# Patient Record
Sex: FEMALE | Race: WHITE | NOT HISPANIC OR LATINO | Employment: FULL TIME | URBAN - METROPOLITAN AREA
[De-identification: names, ages, dates, MRNs, and addresses within clinical notes are randomized per-mention and may not be internally consistent; named-entity substitution may affect disease eponyms.]

---

## 2019-09-16 ENCOUNTER — OFFICE VISIT (OUTPATIENT)
Dept: FAMILY MEDICINE | Facility: CLINIC | Age: 29
End: 2019-09-16
Payer: COMMERCIAL

## 2019-09-16 VITALS
WEIGHT: 128 LBS | RESPIRATION RATE: 16 BRPM | HEIGHT: 59 IN | SYSTOLIC BLOOD PRESSURE: 120 MMHG | HEART RATE: 82 BPM | DIASTOLIC BLOOD PRESSURE: 84 MMHG | OXYGEN SATURATION: 96 % | TEMPERATURE: 98.6 F | BODY MASS INDEX: 25.8 KG/M2

## 2019-09-16 DIAGNOSIS — R79.89 ABNORMAL TSH: ICD-10-CM

## 2019-09-16 DIAGNOSIS — E03.9 HYPOTHYROIDISM, UNSPECIFIED TYPE: ICD-10-CM

## 2019-09-16 DIAGNOSIS — M54.16 RIGHT LUMBAR RADICULOPATHY: Primary | ICD-10-CM

## 2019-09-16 LAB
T3 SERPL-MCNC: 90 NG/DL (ref 60–181)
T3FREE SERPL-MCNC: 2 PG/ML (ref 2.3–4.2)
T4 FREE SERPL-MCNC: 0.79 NG/DL (ref 0.76–1.46)
T4 SERPL-MCNC: 7.5 UG/DL (ref 4.5–13.9)
TSH SERPL DL<=0.005 MIU/L-ACNC: 2.72 MU/L (ref 0.4–4)

## 2019-09-16 PROCEDURE — 84481 FREE ASSAY (FT-3): CPT | Performed by: INTERNAL MEDICINE

## 2019-09-16 PROCEDURE — 84482 T3 REVERSE: CPT | Mod: 90 | Performed by: INTERNAL MEDICINE

## 2019-09-16 PROCEDURE — 84480 ASSAY TRIIODOTHYRONINE (T3): CPT | Performed by: INTERNAL MEDICINE

## 2019-09-16 PROCEDURE — 99203 OFFICE O/P NEW LOW 30 MIN: CPT | Performed by: INTERNAL MEDICINE

## 2019-09-16 PROCEDURE — 84443 ASSAY THYROID STIM HORMONE: CPT | Performed by: INTERNAL MEDICINE

## 2019-09-16 PROCEDURE — 84436 ASSAY OF TOTAL THYROXINE: CPT | Performed by: INTERNAL MEDICINE

## 2019-09-16 PROCEDURE — 99000 SPECIMEN HANDLING OFFICE-LAB: CPT | Performed by: INTERNAL MEDICINE

## 2019-09-16 PROCEDURE — 84439 ASSAY OF FREE THYROXINE: CPT | Performed by: INTERNAL MEDICINE

## 2019-09-16 PROCEDURE — 36415 COLL VENOUS BLD VENIPUNCTURE: CPT | Performed by: INTERNAL MEDICINE

## 2019-09-16 RX ORDER — LITHIUM CARBONATE 150 MG/1
450 CAPSULE ORAL
COMMUNITY
Start: 2019-09-04 | End: 2020-07-30

## 2019-09-16 RX ORDER — LORAZEPAM 1 MG/1
TABLET ORAL
COMMUNITY
Start: 2019-06-27 | End: 2024-03-07

## 2019-09-16 RX ORDER — CETIRIZINE HYDROCHLORIDE 10 MG/1
TABLET ORAL
Refills: 1 | COMMUNITY
Start: 2019-07-23 | End: 2020-12-09

## 2019-09-16 RX ORDER — OMEPRAZOLE 40 MG/1
CAPSULE, DELAYED RELEASE ORAL
Refills: 11 | COMMUNITY
Start: 2019-08-18 | End: 2024-01-24

## 2019-09-16 RX ORDER — LAMOTRIGINE 100 MG/1
TABLET ORAL
Refills: 1 | COMMUNITY
Start: 2019-07-22 | End: 2024-03-07

## 2019-09-16 RX ORDER — HYDROXYZINE HYDROCHLORIDE 25 MG/1
25-50 TABLET, FILM COATED ORAL
COMMUNITY
Start: 2019-08-01 | End: 2024-03-07

## 2019-09-16 RX ORDER — LAMOTRIGINE 25 MG/1
TABLET ORAL
Refills: 0 | COMMUNITY
Start: 2019-08-09 | End: 2024-03-07

## 2019-09-16 RX ORDER — LEVOTHYROXINE SODIUM 25 UG/1
25 TABLET ORAL DAILY
Qty: 42 TABLET | Refills: 1 | Status: SHIPPED | OUTPATIENT
Start: 2019-09-16 | End: 2019-09-16

## 2019-09-16 RX ORDER — LITHIUM CARBONATE 300 MG/1
CAPSULE ORAL
Refills: 0 | COMMUNITY
Start: 2019-08-09 | End: 2020-07-30

## 2019-09-16 ASSESSMENT — PATIENT HEALTH QUESTIONNAIRE - PHQ9: SUM OF ALL RESPONSES TO PHQ QUESTIONS 1-9: 17

## 2019-09-16 ASSESSMENT — MIFFLIN-ST. JEOR: SCORE: 1211.23

## 2019-09-16 ASSESSMENT — PAIN SCALES - GENERAL: PAINLEVEL: MODERATE PAIN (5)

## 2019-09-16 NOTE — PROGRESS NOTES
Subjective     Alexandra Harvey is a 29 year old female who presents to clinic today for the following health issues:    HPI   Hypothyroidism Follow-up      Since last visit, patient describes the following symptoms: weight gain of 20 lbs and fatigue, mood swings, dry skin, constipation, brain fog, brittle nails, fatigue and menstrual issues       Back Pain       Duration: Since 2016         Specific cause: none    Description:   Location of pain: low back right and hip right  Character of pain: dull ache  Pain radiation:radiates into the right foot and mid back, tossing and turning at night.   New numbness or weakness in legs, not attributed to pain:  no     Intensity: Currently 5/10    History:   Pain interferes with job: No, but back hurts during activities such as [place].  History of back problems: yes in upper back and neck   Any previous MRI or X-rays: None  Sees a specialist for back pain:  No  Therapies tried without relief: massage    Alleviating factors:   Improved by: laying down with a pillow in between legs       Precipitating factors:using step stool   Worsened by: Lifting, Bending, Standing, Sitting, Lying Flat and Walking    Accompanying Signs & Symptoms:  Risk of Fracture:  None  Risk of Cauda Equina:  None  Risk of Infection:  None  Risk of Cancer:  None  Risk of Ankylosing Spondylitis:  Onset at age <35, male, AND morning back stiffness. no         Patient Active Problem List   Diagnosis     Anxiety associated with depression     Psoriasis     CARDIOVASCULAR SCREENING; LDL GOAL LESS THAN 160     Abnormal TSH     Past Surgical History:   Procedure Laterality Date     LAPAROSCOPIC APPENDECTOMY N/A 11/28/2016    Procedure: LAPAROSCOPIC APPENDECTOMY;  Surgeon: Cruz Olivia MD;  Location:  OR       Social History     Tobacco Use     Smoking status: Current Every Day Smoker     Smokeless tobacco: Never Used   Substance Use Topics     Alcohol use: Yes     Comment: occasional     Family  "History   Problem Relation Age of Onset     Hypertension Mother      Diabetes Maternal Grandmother      Hypertension Maternal Grandfather      Alcohol/Drug Paternal Grandmother          Allergies   Allergen Reactions     Estradiol      \"welts\" on legs     Recent Labs   Lab Test 09/16/19  0806 11/28/16  1814 12/30/12  0105   ALT  --  17 30   CR  --  0.58 0.62   GFRESTIMATED  --  >90  Non  GFR Calc   >90   GFRESTBLACK  --  >90   GFR Calc   >90   POTASSIUM  --  3.5 3.6   TSH 2.72  --   --       BP Readings from Last 3 Encounters:   09/20/19 133/88   09/16/19 120/84   11/28/16 113/81    Wt Readings from Last 3 Encounters:   09/20/19 57.6 kg (127 lb)   09/16/19 58.1 kg (128 lb)   11/28/16 45.4 kg (100 lb)                      Reviewed and updated as needed this visit by Provider         Review of Systems   ROS COMP: CONSTITUTIONAL:POSITIVE  for fatigue and weight gain  INTEGUMENTARY/SKIN: NEGATIVE for worrisome rashes, moles or lesions  EYES: NEGATIVE for vision changes or irritation  ENT/MOUTH: NEGATIVE for ear, mouth and throat problems  RESP: NEGATIVE for significant cough or SOB  BREAST: NEGATIVE for masses, tenderness or discharge  CV: NEGATIVE for chest pain, palpitations or peripheral edema  GI: NEGATIVE for nausea, abdominal pain, heartburn, or change in bowel habits  : NEGATIVE for frequency, dysuria, or hematuria  MUSCULOSKELETAL:As above.  NEURO: NEGATIVE for HX headaches-migraine, HX CVA, HX TIA, HX seizure D/O, involuntary movements and gait disturbance  ENDOCRINE: NEGATIVE for temperature intolerance, skin/hair changes  HEME: NEGATIVE for bleeding problems  PSYCHIATRIC: NEGATIVE for changes in mood or affect      Objective    /84 (BP Location: Left arm, Patient Position: Chair, Cuff Size: Adult Regular)   Pulse 82   Temp 98.6  F (37  C) (Oral)   Resp 16   Ht 1.499 m (4' 11\")   Wt 58.1 kg (128 lb)   LMP 09/07/2019 (Exact Date)   SpO2 96%   BMI 25.85 kg/m   "   Physical Exam   GENERAL: healthy, alert and no distress  EYES: Eyes grossly normal to inspection and conjunctivae and sclerae normal  HENT: normal cephalic/atraumatic and oral mucous membranes moist  RESP: lungs clear to auscultation - no rales, rhonchi or wheezes  CV: regular rate and rhythm, normal S1 S2, no S3 or S4, no murmur, click or rub, no peripheral edema and peripheral pulses strong  MS: no gross musculoskeletal defects noted, no edema  SKIN: no suspicious lesions or rashes  NEURO: Normal strength and tone, mentation intact and speech normal  Comprehensive back pain exam:  Tenderness of right lower lumbar area., Range of motion not limited by pain, Lower extremity strength functional and equal on both sides, Lower extremity reflexes within normal limits bilaterally and Straight leg positive on  right, indicating possible ipsilateral radiculopathy  PSYCH: fatigued and judgement and insight intact    Diagnostic Test Results:  Results for orders placed or performed in visit on 09/16/19   TSH   Result Value Ref Range    TSH 2.72 0.40 - 4.00 mU/L   T4, free   Result Value Ref Range    T4 Free 0.79 0.76 - 1.46 ng/dL   T3, total   Result Value Ref Range    Triiodothyronine (T3) 90 60 - 181 ng/dL   T3, Free   Result Value Ref Range    Free T3 2.0 (L) 2.3 - 4.2 pg/mL   T3 reverse   Result Value Ref Range    T3, Reverse ng/dL 8.8 (L) 9.0 - 27.0 ng/dL   Thyroxine total   Result Value Ref Range    T4 Total 7.5 4.5 - 13.9 ug/dL           Assessment & Plan     1. Right lumbar radiculopathy  Chronic condition that has worsened with any type of activity. Leads to insomnia since patient is uncomfortable when laying supine. Consider EMG if MRI of lumbar spine.  - XR Lumbar Spine 2/3 Views  - MR Lumbar Spine w/o Contrast; Future    2. Abnormal TSH  Previous TSH last 7/1/2019 is abnormal at 4.65, associated with normal free T4 at 0.9. Thyroid peroxidase antibody is negative. Also obtain second-line and third-line tests.  -  "T4, free  - T3, total  - T3, Free  - T3 reverse  - Thyroxine total    3. Hypothyroidism, unspecified type  Based on today's test, with low free T3, despite normal TSH and free T4, recommend treatment with Levothyroxine since patient is symptomatic.  - TSH  - TSH; Future  - T4, free; Future  - T3, Free; Future  - Thyroxine total; Future  - T3, total; Future  - levothyroxine (SYNTHROID/LEVOTHROID) 25 MCG tablet; Take 1 tablet (25 mcg) by mouth daily     Tobacco Cessation:   reports that she has been smoking. She has never used smokeless tobacco.  Tobacco Cessation Action Plan: Information offered: Patient not interested at this time      BMI:   Estimated body mass index is 25.85 kg/m  as calculated from the following:    Height as of this encounter: 1.499 m (4' 11\").    Weight as of this encounter: 58.1 kg (128 lb).   Weight management plan: diet and exercise.        FUTURE APPOINTMENTS:       - Follow-up visit in one week for discussion of MRI of lumbar spine.      Magdiel Weiner MD  [facility]          "

## 2019-09-16 NOTE — TELEPHONE ENCOUNTER
"Requested Prescriptions   Pending Prescriptions Disp Refills     levothyroxine (SYNTHROID/LEVOTHROID) 25 MCG tablet [Pharmacy Med Name: LEVOTHYROXINE 0.025MG (25MCG) TAB]  Last Written Prescription Date:  9/16/19  Last Fill Quantity: 42,  # refills: 1   Last Office Visit with [facility] or [facility] prescribing provider:  9/16/19   Future Office Visit:      90 tablet 1     Sig: TAKE 1 TABLET(25 MCG) BY MOUTH DAILY       Thyroid Protocol Failed - 9/16/2019  3:34 PM        Failed - Medication is active on med list        Passed - Patient is 12 years or older        Passed - Recent (12 mo) or future (30 days) visit within the authorizing provider's specialty     Patient had office visit in the last 12 months or has a visit in the next 30 days with authorizing provider or within the authorizing provider's specialty.  See \"Patient Info\" tab in inbasket, or \"Choose Columns\" in Meds & Orders section of the refill encounter.              Passed - Normal TSH on file in past 12 months     Recent Labs   Lab Test 09/16/19  0806   TSH 2.72              Passed - No active pregnancy on record     If patient is pregnant or has had a positive pregnancy test, please check TSH.          Passed - No positive pregnancy test in past 12 months     If patient is pregnant or has had a positive pregnancy test, please check TSH.            "

## 2019-09-16 NOTE — PATIENT INSTRUCTIONS
At [facility], we strive to deliver an exceptional experience to you, every time we see you.  If you receive a survey in the mail, please send us back your thoughts. We really do value your feedback.    Based on your medical history, these are the current health maintenance/preventive care services that you are due for (some may have been done at this visit.)  Health Maintenance Due   Topic Date Due     PREVENTIVE CARE VISIT  1990     DEPRESSION ACTION PLAN  1990     PAP  1990     HIV SCREENING  02/10/2005     DTAP/TDAP/TD IMMUNIZATION (1 - Tdap) 02/10/2015     PHQ-9  10/21/2015     INFLUENZA VACCINE (1) 09/01/2019         Suggested websites for health information:  Www.[employer].org : Up to date and easily searchable information on multiple topics.  Www.medlineplus.gov : medication info, interactive tutorials, watch real surgeries online  Www.familydoctor.org : good info from the Academy of Family Physicians  Www.cdc.gov : public health info, travel advisories, epidemics (H1N1)  Www.aap.org : children's health info, normal development, vaccinations  Www.health.state[place].us : [place] dept of health, public health issues in [place], N1N1    Your care team:                            Family Medicine Internal Medicine   MD Magdiel Madrid MD Shantel Branch-Fleming, MD Katya Georgiev PA-C Nam Ho, MD Pediatrics   WOLF Escobar, MD Radha Ramos CNP, MD Deborah Mielke, MD Kim Thein, APRRACQUEL [facility]      Clinic hours: Monday - Thursday 7 am-7 pm; Fridays 7 am-5 pm.   Urgent care: Monday - Friday 11 am-9 pm; Saturday and Sunday 9 am-5 pm.  Pharmacy : Monday -Thursday 8 am-8 pm; Friday 8 am-6 pm; Saturday and Sunday 9 am-5 pm.     Clinic: [phone]   Pharmacy: [phone]    Patient Education     Having EMG and NCS Tests  You will be having electromyography (EMG) and nerve conduction studies (NCS) to  measure your muscle and nerve function. In most cases, both tests are done. NCS is most often done first. You will be asked to lie on an exam table with a blanket over you. You may have 1 or both of the following:    Nerve conduction study (NCS)  During NCS, mild electrical currents are used to test how fast electrical signals move along your nerves. The healthcare provider will put small metal disks (electrodes) on your skin on the area of your body being tested. This will be done using water-based gel or paste. A doctor or technologist will apply mild electrical currents to your skin. Your muscles will twitch, but the test won t harm you. Currents are usually applied to the same area several times. Usually the intensity of the electrical stimulation is increased on each body part. There may be some increasing mild pain that varies from person to person. But the electrical shock is not dangerous. The test may continue on other parts of your body unless the reason for doing the test is limited to a small part of the body.  Electromyography (EMG)  Most of the electrodes will be removed for EMG. The healthcare provider will clean the area being tested with alcohol. A very thin sterile needle will be put into the muscles in this area. When the needle is inserted, you may feel as if your skin is being pinched. Try to relax and do as instructed. You will be asked to relax and contract the muscle being tested. Following instructions will allow your provider to interpret the test results.  Let the technologist know  For your safety and for the success of your test, tell the technologist if you:    Have any bleeding problems    Take blood thinners (anticoagulants) or other medicines, including aspirin    Have any immune system problems    Have had neck or back surgery  You may also be asked questions about your overall health.  Before the test  Prepare for your test as instructed. Shower or bathe, but don't use powder, oil,  or lotion. Your skin should be clean and free of excess oil. Wear loose clothes. But know that you may be asked to change into a hospital gown. The entire test will take about 60 minutes. Allow extra time to check in.  After your test  Before you leave, all electrodes will be removed. You can then get right back to your normal routine. If you feel tired or have some mild pain, take it easy. If you were told to stop taking any medicines for your test, ask when you can start taking them again. Your healthcare provider will let you know when your test results are ready.  Date Last Reviewed: 12/1/2017 2000-2018 [employer]. [address] [place] [address], [place] [address]. All rights reserved. This information is not intended as a substitute for professional medical care. Always follow your healthcare professional's instructions.           Patient Education     Understanding Lumbar Radiculopathy    Lumbar radiculopathy is irritation or inflammation of a nerve root in the low back. It causes symptoms that spread out from the back down one or both legs. To understand this condition, it helps to understand the parts of the spine:    Vertebrae. These are bones that stack to form the spine. The lumbar spine contains the 5 bottom vertebrae.    Disks. These are soft pads of tissue between the vertebrae. They act as shock absorbers for the spine.    Spinal canal. This is a tunnel formed within the stacked vertebrae. In the lumbar spine, nerves run through this canal.    Nerves. These branch off and leave the spinal canal, traveling out to parts of the body. As they leave the spinal canal, nerves pass through openings between the vertebrae. The nerve root is the part of the nerve that is closest to the spinal canal.    Sciatic nerve. This is a large nerve formed from several nerve roots in the low back. This nerve extends down the back of the leg to the foot.  With lumbar radiculopathy, nerve roots in the low back  become irritated. This leads to pain and symptoms. The sciatic nerve is commonly involved, so the condition is often called sciatica.  What causes lumbar radiculopathy?  Aging, injury, poor posture, extra body weight, and other issues can lead to problems in the low back. These problems may then irritate nerve roots. They include:    Damage to a disk in the lumbar spine. The damaged disk may then press on nearby nerve roots.    Degeneration from wear and tear, and aging. This can lead to narrowing (stenosis) of the openings between the vertebrae. The narrowed openings press on nerve roots as they leave the spinal canal.    Unstable spine. This is when a vertebra slips forward. It can then press on a nerve root.  Other, less common things can put pressure on nerves in the low back. These include diabetes, infection, or a tumor.  Symptoms of lumbar radiculopathy  These include:    Pain in the low back    Pain, numbness, tingling, or weakness that travels into the buttocks, hip, groin, or leg    Muscle spasms  Treatment for lumbar radiculopathy  In most cases, your healthcare provider will first try treatments that help relieve symptoms. These may include:    Prescription and over-the-counter pain medicines. These help relieve pain, swelling, and irritation.    Limits on positions and activities that increase pain. But lying in bed or avoiding all movement is only recommended for a short period of time.    Physical therapy, including exercises and stretches. This helps decrease pain and increase movement and function.    Steroid shots into the lower back. This may help relieve symptoms for a time.    Weight-loss program. If you are overweight, losing extra pounds may help relieve symptoms.  In some cases, you may need surgery to fix the underlying problem. This depends on the cause, the symptoms, and how long the pain has lasted.  Possible complications  Over time, an irritated and inflamed nerve may become damaged.  This may lead to long-lasting (permanent) numbness or weakness in your legs and feet. If symptoms change suddenly or get worse, be sure to let your healthcare provider know.  When to call your healthcare provider  Call your healthcare provider right away if you have any of these:    New pain or pain that gets worse    New or increasing weakness, tingling, or numbness in your leg or foot    Problems controlling your bladder or bowel   Date Last Reviewed: 3/10/2016    2255-9836 [employer]. [address] [place] [address]. All rights reserved. This information is not intended as a substitute for professional medical care. Always follow your healthcare professional's instructions.

## 2019-09-17 ENCOUNTER — TELEPHONE (OUTPATIENT)
Dept: FAMILY MEDICINE | Facility: CLINIC | Age: 29
End: 2019-09-17

## 2019-09-17 RX ORDER — LEVOTHYROXINE SODIUM 25 UG/1
25 TABLET ORAL DAILY
Status: CANCELLED | OUTPATIENT
Start: 2019-09-17

## 2019-09-17 RX ORDER — LEVOTHYROXINE SODIUM 25 UG/1
25 TABLET ORAL DAILY
Qty: 42 TABLET | Refills: 1 | Status: SHIPPED | OUTPATIENT
Start: 2019-09-17 | End: 2020-01-22

## 2019-09-17 NOTE — TELEPHONE ENCOUNTER
Writer called and spoke with patient today regarding abnormal thyroid labs and recommendation to be taking Levothyroxine 25 mcg daily.  Per provider documentation, notes that prescription for levothyroxine was sent in to pharmacy but writer is unable to locate any recent script in chart for levothyroxine.    Routing to provider, please review and send in script for Levothyroxine 25 mcg, once daily, as noted. Do not believe this was sent.  Thank you!    Estela Oh RN

## 2019-09-18 ENCOUNTER — ANCILLARY PROCEDURE (OUTPATIENT)
Dept: MRI IMAGING | Facility: CLINIC | Age: 29
End: 2019-09-18
Attending: INTERNAL MEDICINE
Payer: COMMERCIAL

## 2019-09-18 DIAGNOSIS — M54.17 LUMBOSACRAL RADICULOPATHY AT L5: ICD-10-CM

## 2019-09-18 PROCEDURE — 72148 MRI LUMBAR SPINE W/O DYE: CPT | Mod: TC

## 2019-09-18 RX ORDER — LEVOTHYROXINE SODIUM 25 UG/1
TABLET ORAL
Qty: 90 TABLET | Refills: 1 | Status: SHIPPED | OUTPATIENT
Start: 2019-09-18

## 2019-09-18 NOTE — TELEPHONE ENCOUNTER
Routing refill request to provider for review/approval because:  Note from pharmacy is patient is wanting a 90 day supply.    Carrie Carpenter RN, [facility]

## 2019-09-19 ENCOUNTER — TELEPHONE (OUTPATIENT)
Dept: FAMILY MEDICINE | Facility: CLINIC | Age: 29
End: 2019-09-19

## 2019-09-19 LAB — T3REVERSE SERPL-MCNC: 8.8 NG/DL (ref 9–27)

## 2019-09-19 NOTE — TELEPHONE ENCOUNTER
Reason for Call:  Same Day Appointment, Requested Provider:  Regine    PCP: Magdiel Weiner    Reason for visit: fatigue / MRI follow up     Duration of symptoms: recent    Have you been treated for this in the past? Yes    Additional comments: Pt has been falling asleep at the week multiple times this morning and Pt is concerned about that as well .  She also was told to follow up with Dr. Wenier in 1-2 days after her MRI and would like to see if Dr. Weiner can fit Pt in.    Can we leave a detailed message on this number? YES    Phone number patient can be reached at: Home number on file [phone] (home)    Best Time: anytime    Call taken on 9/19/2019 at 8:28 AM by Sean Ly

## 2019-09-19 NOTE — TELEPHONE ENCOUNTER
Called, patient cannot make it today at 10a, patient is scheduled for tomorrow at 8:20a.  Kris Soto,  [facility]

## 2019-09-20 ENCOUNTER — OFFICE VISIT (OUTPATIENT)
Dept: FAMILY MEDICINE | Facility: CLINIC | Age: 29
End: 2019-09-20
Payer: COMMERCIAL

## 2019-09-20 VITALS
BODY MASS INDEX: 25.6 KG/M2 | DIASTOLIC BLOOD PRESSURE: 88 MMHG | HEIGHT: 59 IN | TEMPERATURE: 98.3 F | OXYGEN SATURATION: 98 % | HEART RATE: 83 BPM | SYSTOLIC BLOOD PRESSURE: 133 MMHG | RESPIRATION RATE: 18 BRPM | WEIGHT: 127 LBS

## 2019-09-20 DIAGNOSIS — R40.0 DAYTIME SLEEPINESS: Primary | ICD-10-CM

## 2019-09-20 DIAGNOSIS — M54.16 RIGHT LUMBAR RADICULOPATHY: ICD-10-CM

## 2019-09-20 DIAGNOSIS — M79.18 MYOFASCIAL PAIN: ICD-10-CM

## 2019-09-20 DIAGNOSIS — F31.4 BIPOLAR DISORDER, CURRENT EPISODE DEPRESSED, SEVERE, WITHOUT PSYCHOTIC FEATURES (H): ICD-10-CM

## 2019-09-20 LAB — LITHIUM SERPL-SCNC: 0.49 MMOL/L (ref 0.6–1.2)

## 2019-09-20 PROCEDURE — 36415 COLL VENOUS BLD VENIPUNCTURE: CPT | Performed by: INTERNAL MEDICINE

## 2019-09-20 PROCEDURE — 80178 ASSAY OF LITHIUM: CPT | Performed by: INTERNAL MEDICINE

## 2019-09-20 PROCEDURE — 99214 OFFICE O/P EST MOD 30 MIN: CPT | Performed by: INTERNAL MEDICINE

## 2019-09-20 RX ORDER — CYCLOBENZAPRINE HCL 10 MG
10 TABLET ORAL 3 TIMES DAILY PRN
Qty: 30 TABLET | Refills: 11 | Status: SHIPPED | OUTPATIENT
Start: 2019-09-20 | End: 2021-01-18

## 2019-09-20 ASSESSMENT — MIFFLIN-ST. JEOR: SCORE: 1206.7

## 2019-09-20 ASSESSMENT — PAIN SCALES - GENERAL: PAINLEVEL: MODERATE PAIN (5)

## 2019-09-20 NOTE — PATIENT INSTRUCTIONS
At [facility], we strive to deliver an exceptional experience to you, every time we see you.  If you receive a survey in the mail, please send us back your thoughts. We really do value your feedback.    Based on your medical history, these are the current health maintenance/preventive care services that you are due for (some may have been done at this visit.)  Health Maintenance Due   Topic Date Due     PREVENTIVE CARE VISIT  1990     DEPRESSION ACTION PLAN  1990     PAP  1990     HIV SCREENING  02/10/2005     INFLUENZA VACCINE (1) 09/01/2019         Suggested websites for health information:  Www.[employer].org : Up to date and easily searchable information on multiple topics.  Www.medlineplus.gov : medication info, interactive tutorials, watch real surgeries online  Www.familydoctor.org : good info from the Academy of Family Physicians  Www.cdc.gov : public health info, travel advisories, epidemics (H1N1)  Www.aap.org : children's health info, normal development, vaccinations  Www.health.[facility].us : [place] dept of health, public health issues in [place], N1N1    Your care team:                            Family Medicine Internal Medicine   MD Magdiel Madrid MD Shantel Branch-Fleming, MD Katya Georgiev PA-C Nam Ho, MD Pediatrics   WOLF Escobar, ELISEO Singh APRN CNP   MD Radha Conner MD Deborah Mielke, MD Kim Thein, APRN [facility]      Clinic hours: Monday - Thursday 7 am-7 pm; Fridays 7 am-5 pm.   Urgent care: Monday - Friday 11 am-9 pm; Saturday and Sunday 9 am-5 pm.  Pharmacy : Monday -Thursday 8 am-8 pm; Friday 8 am-6 pm; Saturday and Sunday 9 am-5 pm.     Clinic: [phone]   Pharmacy: [phone]

## 2019-09-20 NOTE — PROGRESS NOTES
Subjective     Alexandra Harvey is a 29 year old female who presents to clinic today for the following health issues:    HPI     Back Pain-MRI follow up      Duration: Since 2016        Specific cause: none    Description:   Location of pain: right sided low back and hip  Character of pain: dull ache and tingling sensation   Pain radiation:right foot, left side of back  New numbness or weakness in legs, not attributed to pain:  no     Intensity: Currently 5/10    History:   Pain interferes with job: No  History of back problems: yes in upper back and neck  Any previous MRI or X-rays: MRI 9/18/19 lumbar spine  Sees a specialist for back pain:  yes  Therapies tried without relief: massage    Alleviating factors:   Improved by: laying down with pillow in between legs      Precipitating factors:  Worsened by: Lifting, Bending, Standing, Sitting, Lying Flat and Walking    Accompanying Signs & Symptoms:  Risk of Fracture:  None  Risk of Cauda Equina:  None  Risk of Infection:  None  Risk of Cancer:  None  Risk of Ankylosing Spondylitis:  Onset at age <35, male, AND morning back stiffness. no       Drowsiness      Duration: two days    Description (location/character/radiation): Episodes of sleepiness during the daytime after taking Levothyroxine 25 mcg.    Intensity:  Moderate-severe. Patient claims that she has to pull over on the side of the road.    Accompanying signs and symptoms: NO episodes of motor weakness nor snoring.    History (similar episodes/previous evaluation): YES.    Precipitating or alleviating factors: Depression. Review of records during 9/16/2019 visit shows that patient has been sleeping more, and she was event sent home work due to inability to concentrate. No alcohol use (quit last May 2019). Patient takes Lamictal, Sertraline and Ativan for bipolar disorder, prescribed by her NP provider from [facility].     Therapies tried and outcome: None         Patient Active Problem List  "  Diagnosis     Anxiety associated with depression     Psoriasis     CARDIOVASCULAR SCREENING; LDL GOAL LESS THAN 160     Abnormal TSH     Lumbosacral radiculopathy at L5, right     Hypothyroidism, unspecified type     Past Surgical History:   Procedure Laterality Date     LAPAROSCOPIC APPENDECTOMY N/A 11/28/2016    Procedure: LAPAROSCOPIC APPENDECTOMY;  Surgeon: Cruz Olivia MD;  Location:  OR       Social History     Tobacco Use     Smoking status: Current Every Day Smoker     Smokeless tobacco: Never Used   Substance Use Topics     Alcohol use: Yes     Comment: occasional     Family History   Problem Relation Age of Onset     Hypertension Mother      Diabetes Maternal Grandmother      Hypertension Maternal Grandfather      Alcohol/Drug Paternal Grandmother          Allergies   Allergen Reactions     Estradiol      \"welts\" on legs     Recent Labs   Lab Test 09/16/19  0806 11/28/16  1814 12/30/12  0105   ALT  --  17 30   CR  --  0.58 0.62   GFRESTIMATED  --  >90  Non  GFR Calc   >90   GFRESTBLACK  --  >90   GFR Calc   >90   POTASSIUM  --  3.5 3.6   TSH 2.72  --   --       BP Readings from Last 3 Encounters:   09/20/19 133/88   09/16/19 120/84   11/28/16 113/81    Wt Readings from Last 3 Encounters:   09/20/19 57.6 kg (127 lb)   09/16/19 58.1 kg (128 lb)   11/28/16 45.4 kg (100 lb)                    Reviewed and updated as needed this visit by Provider         Review of Systems   ROS COMP: CONSTITUTIONAL:POSITIVE  for weight gain.  INTEGUMENTARY/SKIN: NEGATIVE for worrisome rashes, moles or lesions  EYES: NEGATIVE for vision changes or irritation  ENT/MOUTH: NEGATIVE for ear, mouth and throat problems  RESP: NEGATIVE for significant cough or SOB  CV: NEGATIVE for chest pain, palpitations or peripheral edema  GI: NEGATIVE for nausea, abdominal pain, heartburn, or change in bowel habits  : NEGATIVE for frequency, dysuria, or hematuria  MUSCULOSKELETAL: NEGATIVE for " "significant arthralgias but POSITIVE for muscle pain of both trapezius muscles.   NEURO: POSITIVE for memory changes, seen by a neurologist  Last 7/8/2019 at [facility] in [place].  NEGATIVE for HX head injury  , HX CVA, HX TIA and HX seizure D/O  ENDOCRINE: NEGATIVE for temperature intolerance, skin/hair changes  HEME: NEGATIVE for bleeding problems  PSYCHIATRIC: POSITIVE for      Objective    /88 (BP Location: Left arm, Patient Position: Chair, Cuff Size: Adult Regular)   Pulse 83   Temp 98.3  F (36.8  C) (Oral)   Resp 18   Ht 1.499 m (4' 11\")   Wt 57.6 kg (127 lb)   LMP 09/07/2019 (Exact Date)   SpO2 98%   BMI 25.65 kg/m    Body mass index is 25.65 kg/m .  Physical Exam   GENERAL: alert, no distress, over weight and fatigued  EYES: Eyes grossly normal to inspection  HENT: normal cephalic/atraumatic and oral mucous membranes moist  CV: regular rate and rhythm, normal S1 S2, no S3 or S4, no murmur, click or rub, no peripheral edema and peripheral pulses strong  MS: no gross musculoskeletal defects noted, no edema  SKIN: no suspicious lesions or rashes  NEURO: Normal strength and tone, sensory exam grossly normal and mentation intact  Comprehensive back pain exam:  Tenderness of right lower lumbar area. and Straight leg positive on  right, indicating possible ipsilateral radiculopathy    Diagnostic Test Results:  Results for orders placed or performed in visit on 09/20/19   Lithium level   Result Value Ref Range    Lithium Level 0.49 (L) 0.60 - 1.20 mmol/L   MR LUMBAR SPINE WITHOUT CONTRAST 9/18/2019 7:44 PM      HISTORY: Radiculopathy, greater than 6 weeks conservative treatment,  persistent symptoms. Lumbosacral radiculopathy at L5.     TECHNIQUE: Multiplanar multisequence images were obtained through the  lumbar spine without contrast.     COMPARISON: None.     FINDINGS: Five lumbar-type vertebral bodies are presumed. Posterior  alignment is normal. The conus medullaris is " normal with its tip at  the L1-L2 level. Disc spaces are preserved in height and water  content. Bone marrow signal intensity is normal.     T12-L1: Normal.     L1-L2: Normal.     L2-L3: Normal.     L3-L4: Normal.     L4-L5: Normal.     L5-S1: Normal.     Paraspinal soft tissues: Unremarkable as visualized.                                                                      IMPRESSION: Negative lumbar spine MRI examination without contrast.     JOSIAH LOU MD        Assessment & Plan     1. Daytime sleepiness  Patient attributes intake of Levothyroxine 25 mcg as cause of her daytime drowsiness. However, there is broad differential diagnoses, such as hypomania from bipolar disorder with currently depressed mood, adverse drug effect from Levothyroxine or polypharmacy for bipolar disorder. Obtain both Lithium and Sertraline levels. Review of records show patient also has difficulty concentrating and fatigue. Consider starting Strattera, for probable ADHD and suspected sleep disorder.  - Lithium level  - Sertraline; Future  - Sertraline    2. Right lumbar radiculopathy  MRI of lumbar spine explained to Ms. Harvey. Suspect piriformis syndrome. Nonetheless, recommend EMG for definitive diagnosis.  - NEUROLOGY ADULT REFERRAL; Future  - EMG; Future    3. Bipolar disorder, current episode depressed, severe, without psychotic features (H)  No recent drug levels. Patient's drowsiness may still be attributed to her pharmacy.  - Lithium level  - Sertraline; Future    4. Myofascial pain    - cyclobenzaprine (FLEXERIL) 10 MG tablet; Take 1 tablet (10 mg) by mouth 3 times daily as needed for muscle spasms  Dispense: 30 tablet; Refill: 11     Tobacco Cessation:   reports that she has been smoking. She has been smoking about 0.00 packs per day. She has never used smokeless tobacco.  Tobacco Cessation Action Plan: Information offered: Patient not interested at this time      BMI:   Estimated body mass index is 25.65 kg/m  as  "calculated from the following:    Height as of this encounter: 1.499 m (4' 11\").    Weight as of this encounter: 57.6 kg (127 lb).   Weight management plan: dietary changes.        FUTURE APPOINTMENTS:       - Follow-up visit in 2 - 4 weeks.      Magdiel Weiner MD  [facility]    "

## 2019-09-23 PROBLEM — E03.9 HYPOTHYROIDISM, UNSPECIFIED TYPE: Status: ACTIVE | Noted: 2019-09-23

## 2019-09-23 PROBLEM — R79.89 ABNORMAL TSH: Status: ACTIVE | Noted: 2019-09-23

## 2019-09-23 PROBLEM — M54.17 LUMBOSACRAL RADICULOPATHY AT L5: Status: ACTIVE | Noted: 2019-09-23

## 2019-09-23 PROCEDURE — 80332 ANTIDEPRESSANTS CLASS 1 OR 2: CPT | Mod: 90 | Performed by: INTERNAL MEDICINE

## 2019-09-23 PROCEDURE — 99000 SPECIMEN HANDLING OFFICE-LAB: CPT | Performed by: INTERNAL MEDICINE

## 2019-09-23 PROCEDURE — 36415 COLL VENOUS BLD VENIPUNCTURE: CPT | Performed by: INTERNAL MEDICINE

## 2019-09-29 PROBLEM — F43.10 PTSD (POST-TRAUMATIC STRESS DISORDER): Status: ACTIVE | Noted: 2019-09-29

## 2019-09-29 PROBLEM — F41.1 GAD (GENERALIZED ANXIETY DISORDER): Status: ACTIVE | Noted: 2019-09-29

## 2019-09-29 PROBLEM — M54.16 RIGHT LUMBAR RADICULOPATHY: Status: ACTIVE | Noted: 2019-09-29

## 2019-09-29 PROBLEM — F32.2 SEVERE MAJOR DEPRESSION WITHOUT PSYCHOTIC FEATURES (H): Status: ACTIVE | Noted: 2019-09-29

## 2019-09-29 PROBLEM — F33.0 MAJOR DEPRESSIVE DISORDER, RECURRENT, MILD (H): Status: ACTIVE | Noted: 2019-01-18

## 2019-09-29 PROBLEM — F31.9 BIPOLAR DISORDER (H): Status: ACTIVE | Noted: 2019-09-29

## 2019-09-29 PROBLEM — J30.2 SEASONAL ALLERGIES: Status: ACTIVE | Noted: 2019-09-29

## 2019-09-29 PROBLEM — R40.0 DAYTIME SLEEPINESS: Status: ACTIVE | Noted: 2019-09-29

## 2019-09-29 PROBLEM — N80.9 ENDOMETRIOSIS: Status: ACTIVE | Noted: 2018-07-26

## 2019-09-29 PROBLEM — M79.18 MYOFASCIAL PAIN: Status: ACTIVE | Noted: 2019-09-29

## 2019-09-29 PROBLEM — E03.8 SUBCLINICAL HYPOTHYROIDISM: Status: ACTIVE | Noted: 2019-09-29

## 2019-09-30 ENCOUNTER — TRANSFERRED RECORDS (OUTPATIENT)
Dept: HEALTH INFORMATION MANAGEMENT | Facility: CLINIC | Age: 29
End: 2019-09-30

## 2019-10-02 ENCOUNTER — TELEPHONE (OUTPATIENT)
Dept: FAMILY MEDICINE | Facility: CLINIC | Age: 29
End: 2019-10-02

## 2019-10-02 LAB
NORSERTRALINE SERPL-MCNC: 145 NG/ML
SERTRALINE SERPL-MCNC: 68 NG/ML (ref 30–200)

## 2019-10-02 NOTE — TELEPHONE ENCOUNTER
Reason for Call:  Other call back    Detailed comments: Pt states that she received a missed call from  but I couldn't find the encounter I apologize. She would like to request another call. Thank you.    Phone Number Patient can be reached at: Home number on file [phone] (home)    Best Time: Any    Can we leave a detailed message on this number? YES    Call taken on 10/2/2019 at 4:42 PM by Magdalena Almanzar

## 2019-10-04 NOTE — TELEPHONE ENCOUNTER
Called patient and she states thinks she knows who may have called and what it was regarding. No further action needed.    Dave Thorpe CMA

## 2019-10-30 DIAGNOSIS — E03.9 HYPOTHYROIDISM, UNSPECIFIED TYPE: ICD-10-CM

## 2019-10-30 LAB
T3 SERPL-MCNC: 104 NG/DL (ref 60–181)
T3FREE SERPL-MCNC: 2.4 PG/ML (ref 2.3–4.2)
T4 FREE SERPL-MCNC: 0.83 NG/DL (ref 0.76–1.46)
T4 SERPL-MCNC: 8.6 UG/DL (ref 4.5–13.9)
TSH SERPL DL<=0.005 MIU/L-ACNC: 7.04 MU/L (ref 0.4–4)

## 2019-10-30 PROCEDURE — 84481 FREE ASSAY (FT-3): CPT | Performed by: INTERNAL MEDICINE

## 2019-10-30 PROCEDURE — 84439 ASSAY OF FREE THYROXINE: CPT | Performed by: INTERNAL MEDICINE

## 2019-10-30 PROCEDURE — 84436 ASSAY OF TOTAL THYROXINE: CPT | Performed by: INTERNAL MEDICINE

## 2019-10-30 PROCEDURE — 84443 ASSAY THYROID STIM HORMONE: CPT | Performed by: INTERNAL MEDICINE

## 2019-10-30 PROCEDURE — 36415 COLL VENOUS BLD VENIPUNCTURE: CPT | Performed by: INTERNAL MEDICINE

## 2019-10-30 PROCEDURE — 84480 ASSAY TRIIODOTHYRONINE (T3): CPT | Performed by: INTERNAL MEDICINE

## 2020-01-06 ENCOUNTER — MYC MEDICAL ADVICE (OUTPATIENT)
Dept: FAMILY MEDICINE | Facility: CLINIC | Age: 30
End: 2020-01-06

## 2020-01-06 DIAGNOSIS — E03.9 HYPOTHYROIDISM, UNSPECIFIED TYPE: Primary | ICD-10-CM

## 2020-01-06 NOTE — TELEPHONE ENCOUNTER
Patient requesting lab orders before scheduling follow up visit with Dr. Weiner. Orders pended; please sign if appropriate.       Tyrell Pitts RN, BSN, PHN

## 2020-01-12 DIAGNOSIS — E03.9 HYPOTHYROIDISM, UNSPECIFIED TYPE: ICD-10-CM

## 2020-01-12 LAB
T3FREE SERPL-MCNC: 2.4 PG/ML (ref 2.3–4.2)
T4 SERPL-MCNC: 8.7 UG/DL (ref 4.5–13.9)

## 2020-01-12 PROCEDURE — 84436 ASSAY OF TOTAL THYROXINE: CPT | Performed by: INTERNAL MEDICINE

## 2020-01-12 PROCEDURE — 36415 COLL VENOUS BLD VENIPUNCTURE: CPT | Performed by: INTERNAL MEDICINE

## 2020-01-12 PROCEDURE — 84443 ASSAY THYROID STIM HORMONE: CPT | Performed by: INTERNAL MEDICINE

## 2020-01-12 PROCEDURE — 84439 ASSAY OF FREE THYROXINE: CPT | Performed by: INTERNAL MEDICINE

## 2020-01-12 PROCEDURE — 84481 FREE ASSAY (FT-3): CPT | Performed by: INTERNAL MEDICINE

## 2020-01-13 LAB
T4 FREE SERPL-MCNC: 0.93 NG/DL (ref 0.76–1.46)
TSH SERPL DL<=0.005 MIU/L-ACNC: 0.61 MU/L (ref 0.4–4)

## 2020-01-20 ENCOUNTER — OFFICE VISIT (OUTPATIENT)
Dept: FAMILY MEDICINE | Facility: CLINIC | Age: 30
End: 2020-01-20
Payer: COMMERCIAL

## 2020-01-20 VITALS
HEART RATE: 78 BPM | HEIGHT: 59 IN | RESPIRATION RATE: 15 BRPM | DIASTOLIC BLOOD PRESSURE: 86 MMHG | WEIGHT: 141.8 LBS | SYSTOLIC BLOOD PRESSURE: 124 MMHG | BODY MASS INDEX: 28.59 KG/M2 | OXYGEN SATURATION: 100 % | TEMPERATURE: 98.6 F

## 2020-01-20 DIAGNOSIS — S43.81XA: Primary | ICD-10-CM

## 2020-01-20 PROCEDURE — 99213 OFFICE O/P EST LOW 20 MIN: CPT | Performed by: FAMILY MEDICINE

## 2020-01-20 ASSESSMENT — MIFFLIN-ST. JEOR: SCORE: 1273.83

## 2020-01-20 NOTE — NURSING NOTE
"Chief Complaint   Patient presents with     Back Pain     /86   Pulse 78   Temp 98.6  F (37  C) (Oral)   Resp 15   Ht 1.499 m (4' 11\")   Wt 64.3 kg (141 lb 12.8 oz)   SpO2 100%   BMI 28.64 kg/m   Estimated body mass index is 28.64 kg/m  as calculated from the following:    Height as of this encounter: 1.499 m (4' 11\").    Weight as of this encounter: 64.3 kg (141 lb 12.8 oz).  Medication Reconciliation: complete        Health Maintenance Due Pending Provider Review:  Pap Smear    Patient to schedule pap smear.    Maria Teresa Bryson MA  [facility]  [phone]  "

## 2020-01-20 NOTE — PATIENT INSTRUCTIONS
1. Right scapulocostal sprain, initial encounter  - [facility] CHIROPRACTIC REFERRAL; Future  Warm or cold packs as needed     Over the counter ibuprofen 600 mg up to three times daily as needed  For next 3-5 days

## 2020-01-20 NOTE — PROGRESS NOTES
"Subjective     Alexandra Harvey is a 29 year old female who presents to clinic today for the following health issues:    HPI   Musculoskeletal problem/pain      Duration: 2 days    Description  Location: upper back; right shoulder    Intensity:  moderate    Accompanying signs and symptoms: none    History  Previous similar problem: no   Previous evaluation:  none    Precipitating or alleviating factors:  Trauma or overuse: no   Aggravating factors include: most movements    Therapies tried and outcome: NSAID - Ibuprofen  Cleaned bathroom the day before and scrubbed the floor hard  Pain on the right scapular region.    PROBLEMS TO ADD ON...    BP Readings from Last 3 Encounters:   01/20/20 124/86   09/20/19 133/88   09/16/19 120/84    Wt Readings from Last 3 Encounters:   01/20/20 64.3 kg (141 lb 12.8 oz)   09/20/19 57.6 kg (127 lb)   09/16/19 58.1 kg (128 lb)                    PROBLEMS TO ADD ON...  Reviewed and updated as needed this visit by Provider         Review of Systems   ROS COMP: Constitutional, HEENT, cardiovascular, pulmonary, GI, , musculoskeletal, neuro, skin, endocrine and psych systems are negative, except as otherwise noted.      Objective    /86   Resp 15   Ht 1.499 m (4' 11\")   Wt 64.3 kg (141 lb 12.8 oz)   BMI 28.64 kg/m    Body mass index is 28.64 kg/m .  Physical Exam   GENERAL: healthy, alert and no distress  Righ scapular region- starin on papation  Good ROM on shulder and back otherwise   NECK: no adenopathy, no asymmetry, masses, or scars and thyroid normal to palpation  RESP: lungs clear to auscultation - no rales, rhonchi or wheezes  CV: regular rate and rhythm, normal S1 S2, no S3 or S4, no murmur, click or rub, no peripheral edema and peripheral pulses strong  ABDOMEN: soft, nontender, no hepatosplenomegaly, no masses and bowel sounds normal    Diagnostic Test Results:  Labs reviewed in Epic        Assessment & Plan     1. Right scapulocostal sprain, initial " encounter  - [facility] CHIR[facility]RACTIC REFERRAL; Future  Warm or cold packs as needed     Over the counter ibuprofen 600 mg up to three times daily as needed  For next 3-5 days            No follow-ups on file.    Sarah Bunn MD  [facility]

## 2020-01-22 ENCOUNTER — OFFICE VISIT (OUTPATIENT)
Dept: FAMILY MEDICINE | Facility: CLINIC | Age: 30
End: 2020-01-22
Payer: COMMERCIAL

## 2020-01-22 VITALS
HEIGHT: 59 IN | RESPIRATION RATE: 16 BRPM | HEART RATE: 84 BPM | SYSTOLIC BLOOD PRESSURE: 127 MMHG | BODY MASS INDEX: 28.83 KG/M2 | DIASTOLIC BLOOD PRESSURE: 83 MMHG | OXYGEN SATURATION: 97 % | TEMPERATURE: 98.9 F | WEIGHT: 143 LBS

## 2020-01-22 DIAGNOSIS — E03.9 HYPOTHYROIDISM, UNSPECIFIED TYPE: Primary | ICD-10-CM

## 2020-01-22 DIAGNOSIS — M54.16 BILATERAL LUMBAR RADICULOPATHY: ICD-10-CM

## 2020-01-22 DIAGNOSIS — M54.16 RIGHT LUMBAR RADICULOPATHY: ICD-10-CM

## 2020-01-22 DIAGNOSIS — E66.3 OVERWEIGHT (BMI 25.0-29.9): ICD-10-CM

## 2020-01-22 PROCEDURE — 99214 OFFICE O/P EST MOD 30 MIN: CPT | Performed by: INTERNAL MEDICINE

## 2020-01-22 RX ORDER — PHENTERMINE HYDROCHLORIDE 30 MG/1
30 CAPSULE ORAL EVERY MORNING
Qty: 30 CAPSULE | Refills: 5 | Status: SHIPPED | OUTPATIENT
Start: 2020-02-22 | End: 2020-09-02

## 2020-01-22 RX ORDER — PHENTERMINE HYDROCHLORIDE 15 MG/1
15 CAPSULE ORAL EVERY MORNING
Qty: 30 CAPSULE | Refills: 0 | Status: SHIPPED | OUTPATIENT
Start: 2020-01-22 | End: 2020-02-21

## 2020-01-22 RX ORDER — LEVOTHYROXINE SODIUM 25 UG/1
25 TABLET ORAL DAILY
Qty: 90 TABLET | Refills: 3 | Status: SHIPPED | OUTPATIENT
Start: 2020-01-22 | End: 2021-04-12

## 2020-01-22 ASSESSMENT — PAIN SCALES - GENERAL: PAINLEVEL: MODERATE PAIN (4)

## 2020-01-22 ASSESSMENT — MIFFLIN-ST. JEOR: SCORE: 1279.27

## 2020-01-22 NOTE — PATIENT INSTRUCTIONS
At [facility], we strive to deliver an exceptional experience to you, every time we see you.  If you receive a survey in the mail, please send us back your thoughts. We really do value your feedback.    Based on your medical history, these are the current health maintenance/preventive care services that you are due for (some may have been done at this visit.)  Health Maintenance Due   Topic Date Due     PREVENTIVE CARE VISIT  1990     HIV SCREENING  02/10/2005     PNEUMOCOCCAL IMMUNIZATION 19-64 MEDIUM RISK (1 of 1 - PPSV23) 02/10/2009     PAP  02/10/2011     INFLUENZA VACCINE (1) 09/01/2019         Suggested websites for health information:  Www.[facility].org : Up to date and easily searchable information on multiple topics.  Www.medlineplus.gov : medication info, interactive tutorials, watch real surgeries online  Www.familydoctor.org : good info from the Academy of Family Physicians  Www.cdc.gov : public health info, travel advisories, epidemics (H1N1)  Www.aap.org : children's health info, normal development, vaccinations  Www.health.[facility].[place].us : [place] dept of health, public health issues in [place], N1N1    Your care team:                            Family Medicine Internal Medicine   MD Magdiel Madrid MD Shantel Branch-Fleming, MD Katya Georgiev PA-C Nam Ho, MD Pediatrics   WOLF Escobar, MD Radha Ramos CNP, MD Deborah Mielke, MD Kim Thein, APRN CNP      Clinic hours: Monday - Thursday 7 am-7 pm; Fridays 7 am-5 pm.   Urgent care: Monday - Friday 11 am-9 pm; Saturday and Sunday 9 am-5 pm.  Pharmacy : Monday -Thursday 8 am-8 pm; Friday 8 am-6 pm; Saturday and Sunday 9 am-5 pm.     Clinic: [phone]   Pharmacy: [phone]

## 2020-01-22 NOTE — PROGRESS NOTES
"Subjective     Alexandra Harvey is a 29 year old female who presents to clinic today for the following health issues:    HPI   Hypothyroidism Follow-up      Since last visit, patient describes the following symptoms: Weight stable, no hair loss, no skin changes, no constipation, no loose stools    How many days per week do you miss taking your medication? 0    -Discuss EKG for sciatic nerve pain         Patient Active Problem List   Diagnosis     Psoriasis     CARDIOVASCULAR SCREENING; LDL GOAL LESS THAN 160     Lumbosacral radiculopathy at L5, right     Endometriosis     Seasonal allergies     Tobacco use     Subclinical hypothyroidism     Bipolar disorder (H)     PTSD (post-traumatic stress disorder)     JANINE (generalized anxiety disorder)     Severe major depression without psychotic features (H)     Daytime sleepiness     Right lumbar radiculopathy     Myofascial pain     Past Surgical History:   Procedure Laterality Date     LAPAROSCOPIC APPENDECTOMY N/A 11/28/2016    Procedure: LAPAROSCOPIC APPENDECTOMY;  Surgeon: Cruz Olivia MD;  Location:  OR       Social History     Tobacco Use     Smoking status: Current Every Day Smoker     Packs/day: 0.00     Smokeless tobacco: Never Used   Substance Use Topics     Alcohol use: Yes     Comment: 2 - 3x/week.     Family History   Problem Relation Age of Onset     Hypertension Mother      Diabetes Maternal Grandmother      Hypertension Maternal Grandfather      Alcohol/Drug Paternal Grandmother          Allergies   Allergen Reactions     Estradiol      \"welts\" on legs     Recent Labs   Lab Test 01/12/20  1429 10/30/19  1722  11/28/16  1814 12/30/12  0105   ALT  --   --   --  17 30   CR  --   --   --  0.58 0.62   GFRESTIMATED  --   --   --  >90  Non  GFR Calc   >90   GFRESTBLACK  --   --   --  >90   GFR Calc   >90   POTASSIUM  --   --   --  3.5 3.6   TSH 0.61 7.04*   < >  --   --     < > = values in this interval not displayed. "      BP Readings from Last 3 Encounters:   01/22/20 127/83   01/20/20 124/86   09/20/19 133/88    Wt Readings from Last 3 Encounters:   01/22/20 64.9 kg (143 lb)   01/20/20 64.3 kg (141 lb 12.8 oz)   09/20/19 57.6 kg (127 lb)                      Reviewed and updated as needed this visit by Provider         Review of Systems   ROS COMP: CONSTITUTIONAL: NEGATIVE for fever, chills, change in weight  INTEGUMENTARY/SKIN: NEGATIVE for worrisome rashes, moles or lesions  EYES: NEGATIVE for vision changes or irritation  ENT/MOUTH: NEGATIVE for ear, mouth and throat problems  RESP: NEGATIVE for significant cough or SOB  CV: NEGATIVE for chest pain, palpitations or peripheral edema  GI: NEGATIVE for nausea, abdominal pain, heartburn, or change in bowel habits  : NEGATIVE for frequency, dysuria, or hematuria  MUSCULOSKELETAL: NEGATIVE for significant arthralgias or myalgia  NEURO: NEGATIVE for weakness, dizziness or paresthesias  ENDOCRINE: NEGATIVE for temperature intolerance, skin/hair changes  HEME: NEGATIVE for bleeding problems  PSYCHIATRIC: NEGATIVE for changes in mood or affect      Objective    There were no vitals taken for this visit.  There is no height or weight on file to calculate BMI.  Physical Exam   GENERAL: healthy, alert and no distress  EYES: Eyes grossly normal to inspection, PERRL and conjunctivae and sclerae normal  HENT: ear canals and TM's normal, nose and mouth without ulcers or lesions  NECK: no adenopathy, no asymmetry, masses, or scars and thyroid normal to palpation  RESP: lungs clear to auscultation - no rales, rhonchi or wheezes  CV: regular rate and rhythm, normal S1 S2, no S3 or S4, no murmur, click or rub, no peripheral edema and peripheral pulses strong  ABDOMEN: soft, nontender, no hepatosplenomegaly, no masses and bowel sounds normal  MS: no gross musculoskeletal defects noted, no edema  SKIN: no suspicious lesions or rashes  NEURO: Normal strength and tone, mentation intact and speech  normal  PSYCH: mentation appears normal, affect normal/bright    Diagnostic Test Results:  Labs reviewed in Epic        Assessment & Plan     1. Hypothyroidism, unspecified type    - levothyroxine (SYNTHROID/LEVOTHROID) 25 MCG tablet; Take 1 tablet (25 mcg) by mouth daily  Dispense: 90 tablet; Refill: 3    2. Right lumbar radiculopathy    - NEUROLOGY ADULT REFERRAL; Future  - EMG; Future    3. Overweight (BMI 25.0-29.9)    - phentermine (ADIPEX-P) 15 MG capsule; Take 1 capsule (15 mg) by mouth every morning  Dispense: 30 capsule; Refill: 0  - phentermine (ADIPEX-P) 30 MG capsule; Take 1 capsule (30 mg) by mouth every morning  Dispense: 30 capsule; Refill: 5       FUTURE APPOINTMENTS:       - Follow-up visit in 8 weeks.        Magdiel Weiner MD  [facility]

## 2020-02-03 ENCOUNTER — MYC MEDICAL ADVICE (OUTPATIENT)
Dept: FAMILY MEDICINE | Facility: CLINIC | Age: 30
End: 2020-02-03

## 2020-02-03 NOTE — TELEPHONE ENCOUNTER
Routing to provider to advise; patient requesting EMG referral be done for both sides now as the pain has moved to her left side as well.    Tyrell Pitts RN, BSN, PHN

## 2020-03-02 ENCOUNTER — HEALTH MAINTENANCE LETTER (OUTPATIENT)
Age: 30
End: 2020-03-02

## 2020-03-11 ENCOUNTER — OFFICE VISIT (OUTPATIENT)
Dept: NEUROLOGY | Facility: CLINIC | Age: 30
End: 2020-03-11
Attending: INTERNAL MEDICINE
Payer: COMMERCIAL

## 2020-03-11 DIAGNOSIS — M54.16 BILATERAL LUMBAR RADICULOPATHY: ICD-10-CM

## 2020-03-11 NOTE — LETTER
3/11/2020       RE: Alexandra Harvey  [address] [address] [address]     Dear Colleague,    Thank you for referring your patient, Alexandra Harvey, to the [facility] EMG at [facility]. Please see a copy of my visit note below.      [facility]  Electrodiagnostic Laboratory    Nerve Conduction & EMG Report          Patient:       Alexandra Harvey  Patient ID:    [id number]  Gender:        Female  YOB: 1990  Age:           30 Years 1 Months        History & Examination:  30 year old woman with pain in lower back radiating into both legs. Right worse than left. Evaluate for radiculopathy.     Techniques: Motor and sensory conduction studies were done with surface recording electrodes. EMG was done with a concentric needle electrode.     Results:  Nerve conduction studies:   1. Bilateral sural and right superficial peroneal sensory responses are normal.   2. Right peroneal-EDB and bilateral tibial-AH motor responses are normal.     Needle EMG of selected proximal and distal bilateral lower limb muscles was performed as tabulated below. No abnormal spontaneous activity was observed in the sampled muscles. Motor unit potential morphology and recruitment patterns were normal.     Interpretation:  This is a normal study. There is no electrophysiologic evidence of a lumbosacral radiculopathy affecting the lower limbs on the basis of this study.     Aj Bower MD  Department of Neurology         Sensory NCS      Nerve / Sites Rec. Site Onset Peak NP Amp Ref. PP Amp Dist Rahat Ref. Temp     ms ms  V  V  V cm m/s m/s  C   R SURAL - Lat Mall      Calf Ankle 2.97 3.70 30.7 8.0 27.7 14 47.2 38.0 29.7   L SURAL - Lat Mall      Calf Ankle 2.34 3.23 28.0 8.0 31.8 14 59.7 38.0 30.4   R SUP PERONEAL      Lat Leg Chavis 2.08 2.86 10.5  12.8 12.5 60.0 38.0 29.7       Motor NCS      Nerve / Sites Rec. Site Lat Ref. Amp Ref. Rel Amp Dist Rahat Ref. Dur. Area Temp.      ms ms mV mV % cm m/s m/s ms %  C   R DEEP PERONEAL - EDB      Ankle EDB 3.13 6.00 12.6 2.5 100 8   5.78 100 29.7      FibHead EDB 8.49  11.3  89.7 29 54.1 38.0 5.94 92.7 29.7      Pop Fos EDB 10.00  11.0  87.5 8 53.0 38.0 6.04 93.1 29.7   R TIBIAL - AH      Ankle AH 3.44 6.00 13.8 4.0 100 8   5.89 100 29.7      Pop Fos AH 10.36  10.5  75.6 41 59.2 38.0 6.25 100 29.7   L TIBIAL - AH      Ankle AH 4.01 6.00 11.5 4.0 100 8   4.11 100 30.4       F  Wave      Nerve Min F Lat Max F Lat Mean FLat Temp.    ms ms ms  C   R TIBIAL 36.88 39.69 38.84 29.7       EMG Summary Table     Spontaneous MUAP Recruitment    IA Fib/PSW Fasc H.F. Amp Dur. PPP Pattern   R. VAST LATERALIS N None None None N N N N   R. TIB ANTERIOR N None None None N N N N   R. GASTROCN (MED) N None None None N N N N   R. GLUTEUS MED N None None None N N N N   R. LUMB PSP (L) N None None None       L. VAST LATERALIS N None None None N N N N   L. TIB ANTERIOR N None None None N N N N   L. GASTROCN (MED) N None None None N N N N                          Again, thank you for allowing me to participate in the care of your patient.      Sincerely,    Aj Bower MD

## 2020-03-11 NOTE — PROGRESS NOTES
[facility]  Electrodiagnostic Laboratory    Nerve Conduction & EMG Report          Patient:       Alexandra Harvey  Patient ID:    [id number]  Gender:        Female  YOB: 1990  Age:           30 Years 1 Months        History & Examination:  30 year old woman with pain in lower back radiating into both legs. Right worse than left. Evaluate for radiculopathy.     Techniques: Motor and sensory conduction studies were done with surface recording electrodes. EMG was done with a concentric needle electrode.     Results:  Nerve conduction studies:   1. Bilateral sural and right superficial peroneal sensory responses are normal.   2. Right peroneal-EDB and bilateral tibial-AH motor responses are normal.     Needle EMG of selected proximal and distal bilateral lower limb muscles was performed as tabulated below. No abnormal spontaneous activity was observed in the sampled muscles. Motor unit potential morphology and recruitment patterns were normal.     Interpretation:  This is a normal study. There is no electrophysiologic evidence of a lumbosacral radiculopathy affecting the lower limbs on the basis of this study.     Aj Bower MD  Department of Neurology         Sensory NCS      Nerve / Sites Rec. Site Onset Peak NP Amp Ref. PP Amp Dist Rahat Ref. Temp     ms ms  V  V  V cm m/s m/s  C   R SURAL - Lat Mall      Calf Ankle 2.97 3.70 30.7 8.0 27.7 14 47.2 38.0 29.7   L SURAL - Lat Mall      Calf Ankle 2.34 3.23 28.0 8.0 31.8 14 59.7 38.0 30.4   R SUP PERONEAL      Lat Leg Chavis 2.08 2.86 10.5  12.8 12.5 60.0 38.0 29.7       Motor NCS      Nerve / Sites Rec. Site Lat Ref. Amp Ref. Rel Amp Dist Rahat Ref. Dur. Area Temp.     ms ms mV mV % cm m/s m/s ms %  C   R DEEP PERONEAL - EDB      Ankle EDB 3.13 6.00 12.6 2.5 100 8   5.78 100 29.7      FibHead EDB 8.49  11.3  89.7 29 54.1 38.0 5.94 92.7 29.7      Pop Fos EDB 10.00  11.0  87.5 8 53.0 38.0 6.04 93.1 29.7   R TIBIAL - AH      Ankle AH 3.44  6.00 13.8 4.0 100 8   5.89 100 29.7      Pop Fos AH 10.36  10.5  75.6 41 59.2 38.0 6.25 100 29.7   L TIBIAL - AH      Ankle AH 4.01 6.00 11.5 4.0 100 8   4.11 100 30.4       F  Wave      Nerve Min F Lat Max F Lat Mean FLat Temp.    ms ms ms  C   R TIBIAL 36.88 39.69 38.84 29.7       EMG Summary Table     Spontaneous MUAP Recruitment    IA Fib/PSW Fasc H.F. Amp Dur. PPP Pattern   R. VAST LATERALIS N None None None N N N N   R. TIB ANTERIOR N None None None N N N N   R. GASTROCN (MED) N None None None N N N N   R. GLUTEUS MED N None None None N N N N   R. LUMB PSP (L) N None None None       L. VAST LATERALIS N None None None N N N N   L. TIB ANTERIOR N None None None N N N N   L. GASTROCN (MED) N None None None N N N N

## 2020-07-30 ENCOUNTER — VIRTUAL VISIT (OUTPATIENT)
Dept: FAMILY MEDICINE | Facility: CLINIC | Age: 30
End: 2020-07-30
Payer: COMMERCIAL

## 2020-07-30 DIAGNOSIS — F31.4 BIPOLAR DISORDER, CURRENT EPISODE DEPRESSED, SEVERE, WITHOUT PSYCHOTIC FEATURES (H): ICD-10-CM

## 2020-07-30 DIAGNOSIS — T50.905A DRUG-INDUCED NAUSEA AND VOMITING: Primary | ICD-10-CM

## 2020-07-30 DIAGNOSIS — R11.2 DRUG-INDUCED NAUSEA AND VOMITING: Primary | ICD-10-CM

## 2020-07-30 DIAGNOSIS — K21.9 GASTROESOPHAGEAL REFLUX DISEASE, ESOPHAGITIS PRESENCE NOT SPECIFIED: ICD-10-CM

## 2020-07-30 PROCEDURE — 99214 OFFICE O/P EST MOD 30 MIN: CPT | Mod: TEL | Performed by: INTERNAL MEDICINE

## 2020-07-30 RX ORDER — ONDANSETRON 4 MG/1
TABLET, FILM COATED ORAL
Qty: 120 TABLET | Refills: 5 | Status: SHIPPED | OUTPATIENT
Start: 2020-07-30 | End: 2024-03-07

## 2020-07-30 RX ORDER — OMEPRAZOLE 40 MG/1
40 CAPSULE, DELAYED RELEASE ORAL DAILY
Qty: 90 CAPSULE | Refills: 3 | Status: SHIPPED | OUTPATIENT
Start: 2020-07-30 | End: 2021-08-24

## 2020-07-30 RX ORDER — ARIPIPRAZOLE 5 MG/1
10 TABLET ORAL
COMMUNITY
Start: 2020-02-16 | End: 2024-03-07

## 2020-07-30 NOTE — PROGRESS NOTES
"Alexandra Harvey is a 30 year old female who is being evaluated via a billable telephone visit.      The patient has been notified of following:     \"This telephone visit will be conducted via a call between you and your physician/provider. We have found that certain health care needs can be provided without the need for a physical exam.  This service lets us provide the care you need with a short phone conversation.  If a prescription is necessary we can send it directly to your pharmacy.  If lab work is needed we can place an order for that and you can then stop by our lab to have the test done at a later time.    Telephone visits are billed at different rates depending on your insurance coverage. During this emergency period, for some insurers they may be billed the same as an in-person visit.  Please reach out to your insurance provider with any questions.    If during the course of the call the physician/provider feels a telephone visit is not appropriate, you will not be charged for this service.\"    Patient has given verbal consent for Telephone visit?  Yes    What phone number would you like to be contacted at? [phone]    How would you like to obtain your AVS? Ashhart    Subjective     Alexandra Harvey is a 30 year old female who presents via phone visit today for the following health issues:    HPI    Nausea and vomiting      Duration: 2 weeks    Accompanying signs and symptoms: Hot & cold flashes   Notices this is happening after taking her medications and after eating. She did have a recent med increase on her Abilify from 5 to 10 mg/day last week. She is now taking 5 mg BID.    She has tried to take anxiety med just recently to see if this is a reaction to her anxiety. She has been under a lot more stress lately    She did recently take a preg test which was neg. She is not concerned about preg at this time     Nausea      Duration: two weeks, but worse yesterday    Description " "(location/character/radiation): Nausea after taking psychotropic medications and after each meal.    Intensity:  moderate    Accompanying signs and symptoms: denies any fever, chills, diarrhea, constipation or abdominal distention.    History (similar episodes/previous evaluation): None    Precipitating or alleviating factors: Associated with increased dose of Ability from 5 mg BID to 10 mg BID. History of GERD. Previously on Omeprazole 40 mg BID for many years, but refills not renewed by her gastroenterologist. Switched to over-the-counter Omeprazole 20 mg, yet heartburn persists. Stress, since taking Diazepam seems to improve nausea, but she does not want to take frequently.    Therapies tried and outcome: None.       Patient Active Problem List   Diagnosis     Psoriasis     CARDIOVASCULAR SCREENING; LDL GOAL LESS THAN 160     Lumbosacral radiculopathy at L5, right     Endometriosis     Seasonal allergies     Tobacco use     Subclinical hypothyroidism     Bipolar disorder (H)     PTSD (post-traumatic stress disorder)     JANINE (generalized anxiety disorder)     Severe major depression without psychotic features (H)     Daytime sleepiness     Right lumbar radiculopathy     Myofascial pain     Past Surgical History:   Procedure Laterality Date     LAPAROSCOPIC APPENDECTOMY N/A 11/28/2016    Procedure: LAPAROSCOPIC APPENDECTOMY;  Surgeon: Cruz Olivia MD;  Location:  OR       Social History     Tobacco Use     Smoking status: Current Every Day Smoker     Packs/day: 0.00     Smokeless tobacco: Never Used   Substance Use Topics     Alcohol use: Yes     Comment: 2 - 3x/week.     Family History   Problem Relation Age of Onset     Hypertension Mother      Diabetes Maternal Grandmother      Hypertension Maternal Grandfather      Alcohol/Drug Paternal Grandmother          Allergies   Allergen Reactions     Estradiol      \"welts\" on legs     Recent Labs   Lab Test 01/12/20  1429 10/30/19  1722  11/28/16  1814 " 12/30/12  0105   ALT  --   --   --  17 30   CR  --   --   --  0.58 0.62   GFRESTIMATED  --   --   --  >90  Non  GFR Calc   >90   GFRESTBLACK  --   --   --  >90   GFR Calc   >90   POTASSIUM  --   --   --  3.5 3.6   TSH 0.61 7.04*   < >  --   --     < > = values in this interval not displayed.      BP Readings from Last 3 Encounters:   01/22/20 127/83   01/20/20 124/86   09/20/19 133/88    Wt Readings from Last 3 Encounters:   01/22/20 64.9 kg (143 lb)   01/20/20 64.3 kg (141 lb 12.8 oz)   09/20/19 57.6 kg (127 lb)                    Reviewed and updated as needed this visit by Provider         Review of Systems   CONSTITUTIONAL: NEGATIVE for fever, chills, change in weight  INTEGUMENTARY/SKIN: NEGATIVE for worrisome rashes, moles or lesions  EYES: NEGATIVE for vision changes or irritation  ENT/MOUTH: NEGATIVE for ear, mouth and throat problems  RESP: NEGATIVE for significant cough or SOB  BREAST: NEGATIVE for masses, tenderness or discharge  CV: NEGATIVE for chest pain, palpitations or peripheral edema  GI: POSITIVE for Hx GERD and NEGATIVE for constipation, diarrhea, dysphagia, hematemesis, hematochezia, jaundice and melena  : NEGATIVE for frequency, dysuria, or hematuria  MUSCULOSKELETAL: NEGATIVE for significant arthralgias or myalgia  NEURO: NEGATIVE for weakness, dizziness or paresthesias  ENDOCRINE: NEGATIVE for temperature intolerance, skin/hair changes  HEME: NEGATIVE for bleeding problems  PSYCHIATRIC: POSITIVE for bipolar disorder, previously on Lithium (switched to Abilify).        Objective   Reported vitals:  There were no vitals taken for this visit.     Remainder of exam unable to be completed due to telephone visits    Diagnostic Test Results:  Labs reviewed in Epic        Assessment/Plan:    1. Drug-induced nausea and vomiting  Most probable cause of nausea. Not associated with dysphagia. If condition does not improve, then I recommend esophagram, followed by an  upper endoscopy.  - ondansetron (ZOFRAN) 4 MG tablet; Take 30 minutes before meals and 30 minutes before each dose of Ability. Total of 4 doses per day.  Dispense: 120 tablet; Refill: 5    2. Gastroesophageal reflux disease, esophagitis presence not specified  Patient that there is no associated malignancy with long-term use of PPIs. I reiterated that Ranitidine was withdrawn from the market.  -Restart omeprazole (PRILOSEC) 40 MG DR capsule; Take 1 capsule (40 mg) by mouth daily  Dispense: 90 capsule; Refill: 3    3. Bipolar disorder, current episode depressed, severe, without psychotic features (H)  Stable on current regimen of Ability, Lamotrigine and Sertraline, managed by an outside provider.    Follow-up in 4 weeks or earlier as needed.    Phone call duration:  7 minutes  Start: 4:11 AM  End: 4:18 PM    Magdiel Weiner MD

## 2020-08-31 DIAGNOSIS — E66.3 OVERWEIGHT: ICD-10-CM

## 2020-09-02 RX ORDER — PHENTERMINE HYDROCHLORIDE 30 MG/1
CAPSULE ORAL
Qty: 30 CAPSULE | Refills: 5 | Status: SHIPPED | OUTPATIENT
Start: 2020-09-02 | End: 2024-03-07

## 2020-09-02 NOTE — TELEPHONE ENCOUNTER
Refills sent.    Remind patient that she is due for annual preventive care visit.   Returned call to mother and informed of abnormal strep culture.  Will need antibiotic treatment.  Mother states she noticed pus pockets in the throat and fever last night and didn't send him to school today.  Will send treatment and extend school excuse.

## 2020-09-02 NOTE — TELEPHONE ENCOUNTER
Requested Prescriptions   Pending Prescriptions Disp Refills     phentermine (ADIPEX-P) 30 MG capsule [Pharmacy Med Name: PHENTERMINE HCL 30MG CAPSULES] 30 capsule      Sig: TAKE 1 CAPSULE(30 MG) BY MOUTH EVERY MORNING       There is no refill protocol information for this order        Routing refill request to provider for review/approval because:  Drug not on the [facility] refill protocol         Tyrell Pitts RN, BSN, PHN

## 2020-09-17 DIAGNOSIS — E66.3 OVERWEIGHT: ICD-10-CM

## 2020-09-17 RX ORDER — PHENTERMINE HYDROCHLORIDE 30 MG/1
CAPSULE ORAL
Qty: 30 CAPSULE | Refills: 5 | Status: CANCELLED | OUTPATIENT
Start: 2020-09-17

## 2020-09-17 NOTE — TELEPHONE ENCOUNTER
Please contact patient and inform her to contact her pharmacy, should have refills on file.  #30 tabs with 5 additional refills was sent to [facility] in [place] on 9/2/20.          Estela Oh RN  [facility]

## 2020-09-17 NOTE — TELEPHONE ENCOUNTER
Reason for Call:  Other prescription    Detailed comments: Pt states that she is looking to get this med refilled as soon as possible. Is also requesting a call back to confirm.  Pharmacy is attached. Thank you.    phentermine (ADIPEX-P) 30 MG capsule    Phone Number Patient can be reached at: Home number on file [phone] (home)    Best Time: Any    Can we leave a detailed message on this number? YES    Call taken on 9/17/2020 at 4:05 PM by Magdalena Almanzar

## 2020-09-22 NOTE — TELEPHONE ENCOUNTER
This writer attempted to contact Alexandra on 09/22/20      Reason for call refill and left detailed message.      If patient calls back:   1st floor [facility] Team (MA/TC) called. Inform patient that someone from the team will contact them, document that pt called and route to care team.         Monica Manning MA

## 2020-12-09 ENCOUNTER — VIRTUAL VISIT (OUTPATIENT)
Dept: FAMILY MEDICINE | Facility: CLINIC | Age: 30
End: 2020-12-09
Payer: COMMERCIAL

## 2020-12-09 DIAGNOSIS — J30.9 ALLERGIC RHINITIS, UNSPECIFIED SEASONALITY, UNSPECIFIED TRIGGER: Primary | ICD-10-CM

## 2020-12-09 PROCEDURE — 99213 OFFICE O/P EST LOW 20 MIN: CPT | Mod: TEL | Performed by: FAMILY MEDICINE

## 2020-12-09 RX ORDER — CETIRIZINE HYDROCHLORIDE 10 MG/1
10 TABLET ORAL DAILY
Qty: 90 TABLET | Refills: 3 | Status: SHIPPED | OUTPATIENT
Start: 2020-12-09 | End: 2024-03-07

## 2020-12-09 NOTE — PROGRESS NOTES
"Alexandra Harvey is a 30 year old female who is being evaluated via a billable telephone visit.      The patient has been notified of following:     \"This telephone visit will be conducted via a call between you and your physician/provider. We have found that certain health care needs can be provided without the need for a physical exam.  This service lets us provide the care you need with a short phone conversation.  If a prescription is necessary we can send it directly to your pharmacy.  If lab work is needed we can place an order for that and you can then stop by our lab to have the test done at a later time.    Telephone visits are billed at different rates depending on your insurance coverage. During this emergency period, for some insurers they may be billed the same as an in-person visit.  Please reach out to your insurance provider with any questions.    If during the course of the call the physician/provider feels a telephone visit is not appropriate, you will not be charged for this service.\"    Patient has given verbal consent for Telephone visit?  Yes    What phone number would you like to be contacted at? mobile    How would you like to obtain your AVS? Mail a copy    Subjective     Alexandra Harvey is a 30 year old female who presents via phone visit today for the following health issues:    HPI     Patient with history of allergic rhinitis with rhinorrhea and pruritis requesting refills of cetirizine. This medication works well for patient without side effects.    Review of Systems   Constitutional, HEENT, cardiovascular, pulmonary, GI, , musculoskeletal, neuro, skin, endocrine and psych systems are negative, except as otherwise noted.       Objective          Vitals:  No vitals were obtained today due to virtual visit.    healthy, alert and no distress  PSYCH: Alert and oriented times 3; coherent speech, normal   rate and volume, able to articulate logical thoughts, able   to abstract " "reason, no tangential thoughts, no hallucinations   or delusions  Her affect is normal  RESP: No cough, no audible wheezing, able to talk in full sentences  Remainder of exam unable to be completed due to telephone visits          Assessment/Plan:    Assessment & Plan     Allergic rhinitis, unspecified seasonality, unspecified trigger  rx refilled.  - cetirizine (ZYRTEC) 10 MG tablet; Take 1 tablet (10 mg) by mouth daily     Tobacco Cessation:   reports that she has been smoking. She has been smoking about 0.00 packs per day. She has never used smokeless tobacco.  Tobacco Cessation Action Plan: Information offered: Patient not interested at this time      BMI:   Estimated body mass index is 28.88 kg/m  as calculated from the following:    Height as of 1/22/20: 1.499 m (4' 11\").    Weight as of 1/22/20: 64.9 kg (143 lb).            See Patient Instructions    Return in about 6 months (around 6/9/2021).    William Ayon MD, MD  [facility]    Phone call duration:  5 minutes                "

## 2021-01-17 DIAGNOSIS — M79.18 MYOFASCIAL PAIN: ICD-10-CM

## 2021-01-18 RX ORDER — CYCLOBENZAPRINE HCL 10 MG
10 TABLET ORAL 3 TIMES DAILY PRN
Qty: 30 TABLET | Refills: 5 | Status: SHIPPED | OUTPATIENT
Start: 2021-01-18 | End: 2024-03-07

## 2021-01-18 NOTE — TELEPHONE ENCOUNTER
Routing refill request to provider for review/approval because:  Drug not on the [facility] refill protocol     Tyrell Pitts RN, BSN, PHN

## 2021-04-18 ENCOUNTER — HEALTH MAINTENANCE LETTER (OUTPATIENT)
Age: 31
End: 2021-04-18

## 2021-04-27 ENCOUNTER — TRANSFERRED RECORDS (OUTPATIENT)
Dept: HEALTH INFORMATION MANAGEMENT | Facility: CLINIC | Age: 31
End: 2021-04-27

## 2021-08-23 DIAGNOSIS — K21.9 GASTROESOPHAGEAL REFLUX DISEASE: ICD-10-CM

## 2021-08-24 RX ORDER — OMEPRAZOLE 40 MG/1
CAPSULE, DELAYED RELEASE ORAL
Qty: 90 CAPSULE | Refills: 1 | Status: SHIPPED | OUTPATIENT
Start: 2021-08-24 | End: 2024-03-07

## 2021-10-02 ENCOUNTER — HEALTH MAINTENANCE LETTER (OUTPATIENT)
Age: 31
End: 2021-10-02

## 2022-02-17 PROBLEM — K21.9 GASTROESOPHAGEAL REFLUX DISEASE: Status: ACTIVE | Noted: 2020-07-30

## 2022-05-14 ENCOUNTER — HEALTH MAINTENANCE LETTER (OUTPATIENT)
Age: 32
End: 2022-05-14

## 2022-08-21 DIAGNOSIS — E03.9 HYPOTHYROIDISM, UNSPECIFIED TYPE: ICD-10-CM

## 2022-08-23 RX ORDER — LEVOTHYROXINE SODIUM 25 UG/1
TABLET ORAL
Qty: 90 TABLET | Refills: 1 | Status: SHIPPED | OUTPATIENT
Start: 2022-08-23 | End: 2023-04-10

## 2022-09-04 ENCOUNTER — HEALTH MAINTENANCE LETTER (OUTPATIENT)
Age: 32
End: 2022-09-04

## 2023-02-09 DIAGNOSIS — F99: ICD-10-CM

## 2023-02-09 DIAGNOSIS — E63.9 NUTRITIONAL DISORDER: ICD-10-CM

## 2023-02-09 DIAGNOSIS — Z79.899 HIGH RISK MEDICATION USE: Primary | ICD-10-CM

## 2023-04-08 DIAGNOSIS — E03.9 HYPOTHYROIDISM, UNSPECIFIED TYPE: ICD-10-CM

## 2023-04-10 RX ORDER — LEVOTHYROXINE SODIUM 25 UG/1
TABLET ORAL
Qty: 90 TABLET | Refills: 1 | Status: SHIPPED | OUTPATIENT
Start: 2023-04-10 | End: 2024-03-07

## 2023-06-03 ENCOUNTER — HEALTH MAINTENANCE LETTER (OUTPATIENT)
Age: 33
End: 2023-06-03

## 2024-01-24 ENCOUNTER — OFFICE VISIT (OUTPATIENT)
Dept: URGENT CARE | Facility: URGENT CARE | Age: 34
End: 2024-01-24
Payer: COMMERCIAL

## 2024-01-24 VITALS
WEIGHT: 118 LBS | DIASTOLIC BLOOD PRESSURE: 72 MMHG | OXYGEN SATURATION: 99 % | BODY MASS INDEX: 23.83 KG/M2 | SYSTOLIC BLOOD PRESSURE: 104 MMHG | HEART RATE: 61 BPM | TEMPERATURE: 97.9 F | RESPIRATION RATE: 18 BRPM

## 2024-01-24 DIAGNOSIS — J01.90 ACUTE SINUSITIS WITH SYMPTOMS > 10 DAYS: Primary | ICD-10-CM

## 2024-01-24 PROCEDURE — 99203 OFFICE O/P NEW LOW 30 MIN: CPT | Performed by: PHYSICIAN ASSISTANT

## 2024-01-25 NOTE — PROGRESS NOTES
Assessment & Plan     Acute sinusitis with symptoms > 10 days  - amoxicillin-clavulanate (AUGMENTIN) 875-125 MG tablet; Take 1 tablet by mouth 2 times daily for 7 days    We discussed sinus symptoms are very likely viral.  Because she is got clear drainage and symptoms are improving, wait on starting antibiotic.  I recommend using Flonase, Zyrtec, ibuprofen.  If symptoms worsen or persist for 3-5 more days, start Augmentin which is on hold at the pharmacy.    Return in about 1 week (around 1/31/2024) for visit with primary care provider if not improving.     [facility]    Subjective   Alexandra Jordan is a 33 year old who presents for the following health issues     Patient presents with:  Cough  Nasal Congestion      HPI    Alexandra presents clinic today for evaluation of nasal congestion, facial pain and pressure, headache, cough.  Symptoms first began 10 days ago.  Her nasal congestion has been clear.  Symptoms seem to be slightly improving.  Her cough is gets coming from deep in her chest and is wet, productive of yellow sputum.  No fevers.    Review of Systems   ROS negative except as stated above.      Objective    /72 (BP Location: Left arm, Patient Position: Sitting, Cuff Size: Adult Regular)   Pulse 61   Temp 97.9  F (36.6  C) (Tympanic)   Resp 18   Wt 53.5 kg (118 lb)   SpO2 99%   BMI 23.83 kg/m    Physical Exam   GENERAL: alert and no distress  EYES: Eyes grossly normal to inspection, PERRL and conjunctivae and sclerae normal  HENT: ear canals and TM's normal, nose minimally edematous turbinates bilaterally and mouth without ulcers or lesions  NECK: no adenopathy, no asymmetry, masses, or scars  RESP: lungs clear to auscultation - no rales, rhonchi or wheezes  CV: regular rate and rhythm, normal S1 S2, no S3 or S4, no murmur, click or rub, no peripheral edema  MS: no gross musculoskeletal defects noted, no edema    No results found for any  visits on 01/24/24.

## 2024-01-25 NOTE — PATIENT INSTRUCTIONS
Your symptoms appear to be viral at this time.  Secondary bacterial infections only happen in about 0.5-2% of cases.  Antibiotics are recommended only if you do not have any relief from your symptoms in 10 days or symptoms worsen after 7 days.  Nasal congestion often starts clear then turns yellow or green towards the end- this is not a sign of a bacterial infection.  Augmentin sent to pharmacy. If symptoms worsen or do not improve in 3-5 days, start this.    Flonase (fluticasone) 2 sprays in each nostril daily until symptoms resolve, then continue 1 spray in each nostril for at least 5 more days.  Take Tylenol or an NSAID such as ibuprofen or naproxen as needed for pain.  May use netti pot with bottled or distilled water and saline packets to flush sinuses.  Sudafed (pseudoephedrine) behind the pharmacist counter for 3-5 days helps relieve congestion.  Mucinex (guiafenesin) thins mucus and may help it to loosen more quickly  Saline drops or nasal sprays may loosen mucus.  Sit in the bathroom with the door closed and hot shower running to loosen mucus.  Contact primary care clinic if you do not have any relief from your symptoms after 10 days.  Present to emergency room for significantly increasing pain, persistent high fever >102F, swelling/redness around your eyes, changes in your vision or ability to move your eyes, altered mental status or a severe headache.

## 2024-02-02 ENCOUNTER — OFFICE VISIT (OUTPATIENT)
Dept: URGENT CARE | Facility: URGENT CARE | Age: 34
End: 2024-02-02
Payer: COMMERCIAL

## 2024-02-02 VITALS
OXYGEN SATURATION: 99 % | SYSTOLIC BLOOD PRESSURE: 122 MMHG | HEART RATE: 84 BPM | BODY MASS INDEX: 23.43 KG/M2 | RESPIRATION RATE: 18 BRPM | DIASTOLIC BLOOD PRESSURE: 85 MMHG | TEMPERATURE: 98.4 F | WEIGHT: 116 LBS

## 2024-02-02 DIAGNOSIS — J01.90 ACUTE SINUSITIS TREATED WITH ANTIBIOTICS IN THE PAST 60 DAYS: Primary | ICD-10-CM

## 2024-02-02 PROCEDURE — 99213 OFFICE O/P EST LOW 20 MIN: CPT | Performed by: PHYSICIAN ASSISTANT

## 2024-02-02 RX ORDER — DOXYCYCLINE 100 MG/1
100 CAPSULE ORAL 2 TIMES DAILY
Qty: 20 CAPSULE | Refills: 0 | Status: SHIPPED | OUTPATIENT
Start: 2024-02-02 | End: 2024-02-12

## 2024-02-02 NOTE — PROGRESS NOTES
Assessment & Plan     Acute sinusitis treated with antibiotics in the past 60 days  - doxycycline hyclate (VIBRAMYCIN) 100 MG capsule; Take 1 capsule (100 mg) by mouth 2 times daily for 10 days  - Adult ENT  Referral; Future    We discussed switching to a different antibiotic.  Doxycycline twice daily for 10 days.  Continue Flonase, Mucinex, sinus rinse.  Referral to see ENT.  If symptoms worsen or fail to improve after taking course of doxycycline, follow-up for further evaluation.    Return in about 1 week (around 2/9/2024) for visit with primary care provider if not improving.     Sofya Genao PA-C  [facility]    Subjective   Alexandra Harvey is a 33 year old who presents for the following health issues     Patient presents with:  Urgent Care  Sinus Problem: Per patient symptoms have been ongoing for three weeks was seen a weeks ago given antibiotics for symptoms but still having them , nasal congestion has gotten better       HPI    Alexandra presents clinic today for evaluation of persistent sinus symptoms.  Symptoms first began about 3 weeks ago.  She was started on Augmentin after approximately 10 days of symptoms.  She took this twice a day for 7 days and finished yesterday.  Symptoms did improve around day 5 but are worsening again now.  She still has significant nasal congestion, sinus pain and pressure and a cough that feels gets coming from deep in her chest.    Review of Systems   ROS negative except as stated above.      Objective    /85   Pulse 84   Temp 98.4  F (36.9  C) (Tympanic)   Resp 18   Wt 52.6 kg (116 lb)   SpO2 99%   BMI 23.43 kg/m    Physical Exam   GENERAL: alert and no distress  EYES: Eyes grossly normal to inspection, PERRL and conjunctivae and sclerae normal  HENT: ear canals and TM's normal, nose with erythematous and edematous turbinates bilaterally, purulent discharge in left nostril. Mouth without ulcers or lesions  NECK: no  adenopathy, no asymmetry, masses, or scars  RESP: lungs clear to auscultation - no rales, rhonchi or wheezes  CV: regular rate and rhythm, normal S1 S2, no S3 or S4, no murmur, click or rub, no peripheral edema    No results found for any visits on 02/02/24.

## 2024-02-02 NOTE — PATIENT INSTRUCTIONS
Doxycyline twice daily for 10 days  Flonase (fluticasone) 2 sprays in each nostril daily until symptoms resolve, then continue 1 spray in each nostril for at least 5 more days.  Take Tylenol or an NSAID such as ibuprofen or naproxen as needed for pain.  May use netti pot with bottled or distilled water and saline packets to flush sinuses.  Sudafed (pseudoephedrine) behind the pharmacist counter for 3-5 days helps relieve congestion.  Mucinex (guiafenesin) thins mucus and may help it to loosen more quickly  Saline drops or nasal sprays may loosen mucus.  Sit in the bathroom with the door closed and hot shower running to loosen mucus.    Contact primary care clinic if you do not have any relief from your symptoms after 10 days.  Present to emergency room for significantly increasing pain, persistent high fever >102F, swelling/redness around your eyes, changes in your vision or ability to move your eyes, altered mental status or a severe headache.

## 2024-03-07 ENCOUNTER — OFFICE VISIT (OUTPATIENT)
Dept: FAMILY MEDICINE | Facility: CLINIC | Age: 34
End: 2024-03-07
Payer: COMMERCIAL

## 2024-03-07 VITALS
OXYGEN SATURATION: 100 % | DIASTOLIC BLOOD PRESSURE: 81 MMHG | TEMPERATURE: 97.4 F | WEIGHT: 113 LBS | RESPIRATION RATE: 18 BRPM | BODY MASS INDEX: 22.78 KG/M2 | SYSTOLIC BLOOD PRESSURE: 116 MMHG | HEIGHT: 59 IN | HEART RATE: 64 BPM

## 2024-03-07 DIAGNOSIS — Z00.00 ROUTINE GENERAL MEDICAL EXAMINATION AT A HEALTH CARE FACILITY: Primary | ICD-10-CM

## 2024-03-07 DIAGNOSIS — Z12.4 CERVICAL CANCER SCREENING: ICD-10-CM

## 2024-03-07 DIAGNOSIS — E03.8 SUBCLINICAL HYPOTHYROIDISM: ICD-10-CM

## 2024-03-07 DIAGNOSIS — R11.0 NAUSEA: ICD-10-CM

## 2024-03-07 DIAGNOSIS — F17.210 CIGARETTE NICOTINE DEPENDENCE WITHOUT COMPLICATION: ICD-10-CM

## 2024-03-07 LAB
ALBUMIN SERPL BCG-MCNC: 4.2 G/DL (ref 3.5–5.2)
ALP SERPL-CCNC: 52 U/L (ref 40–150)
ALT SERPL W P-5'-P-CCNC: 14 U/L (ref 0–50)
ANION GAP SERPL CALCULATED.3IONS-SCNC: 6 MMOL/L (ref 7–15)
AST SERPL W P-5'-P-CCNC: 17 U/L (ref 0–45)
BASOPHILS # BLD AUTO: 0 10E3/UL (ref 0–0.2)
BASOPHILS NFR BLD AUTO: 1 %
BILIRUB SERPL-MCNC: 0.4 MG/DL
BUN SERPL-MCNC: 13.1 MG/DL (ref 6–20)
CALCIUM SERPL-MCNC: 8.7 MG/DL (ref 8.6–10)
CHLORIDE SERPL-SCNC: 107 MMOL/L (ref 98–107)
CREAT SERPL-MCNC: 0.78 MG/DL (ref 0.51–0.95)
DEPRECATED HCO3 PLAS-SCNC: 26 MMOL/L (ref 22–29)
EGFRCR SERPLBLD CKD-EPI 2021: >90 ML/MIN/1.73M2
EOSINOPHIL # BLD AUTO: 0.2 10E3/UL (ref 0–0.7)
EOSINOPHIL NFR BLD AUTO: 3 %
ERYTHROCYTE [DISTWIDTH] IN BLOOD BY AUTOMATED COUNT: 14.6 % (ref 10–15)
GLUCOSE SERPL-MCNC: 91 MG/DL (ref 70–99)
HCT VFR BLD AUTO: 42 % (ref 35–47)
HGB BLD-MCNC: 13.2 G/DL (ref 11.7–15.7)
IMM GRANULOCYTES # BLD: 0 10E3/UL
IMM GRANULOCYTES NFR BLD: 0 %
LYMPHOCYTES # BLD AUTO: 2.5 10E3/UL (ref 0.8–5.3)
LYMPHOCYTES NFR BLD AUTO: 35 %
MCH RBC QN AUTO: 25.5 PG (ref 26.5–33)
MCHC RBC AUTO-ENTMCNC: 31.4 G/DL (ref 31.5–36.5)
MCV RBC AUTO: 81 FL (ref 78–100)
MONOCYTES # BLD AUTO: 0.5 10E3/UL (ref 0–1.3)
MONOCYTES NFR BLD AUTO: 6 %
NEUTROPHILS # BLD AUTO: 4.1 10E3/UL (ref 1.6–8.3)
NEUTROPHILS NFR BLD AUTO: 56 %
PLATELET # BLD AUTO: 222 10E3/UL (ref 150–450)
POTASSIUM SERPL-SCNC: 4.3 MMOL/L (ref 3.4–5.3)
PROT SERPL-MCNC: 6.6 G/DL (ref 6.4–8.3)
RBC # BLD AUTO: 5.17 10E6/UL (ref 3.8–5.2)
SODIUM SERPL-SCNC: 139 MMOL/L (ref 135–145)
TSH SERPL DL<=0.005 MIU/L-ACNC: 1.45 UIU/ML (ref 0.3–4.2)
WBC # BLD AUTO: 7.3 10E3/UL (ref 4–11)

## 2024-03-07 PROCEDURE — 87624 HPV HI-RISK TYP POOLED RSLT: CPT | Performed by: NURSE PRACTITIONER

## 2024-03-07 PROCEDURE — 90471 IMMUNIZATION ADMIN: CPT | Performed by: NURSE PRACTITIONER

## 2024-03-07 PROCEDURE — 36415 COLL VENOUS BLD VENIPUNCTURE: CPT | Performed by: NURSE PRACTITIONER

## 2024-03-07 PROCEDURE — 80053 COMPREHEN METABOLIC PANEL: CPT | Performed by: NURSE PRACTITIONER

## 2024-03-07 PROCEDURE — 99213 OFFICE O/P EST LOW 20 MIN: CPT | Mod: 25 | Performed by: NURSE PRACTITIONER

## 2024-03-07 PROCEDURE — 84443 ASSAY THYROID STIM HORMONE: CPT | Performed by: NURSE PRACTITIONER

## 2024-03-07 PROCEDURE — 99395 PREV VISIT EST AGE 18-39: CPT | Mod: 25 | Performed by: NURSE PRACTITIONER

## 2024-03-07 PROCEDURE — G0145 SCR C/V CYTO,THINLAYER,RESCR: HCPCS | Performed by: NURSE PRACTITIONER

## 2024-03-07 PROCEDURE — 85025 COMPLETE CBC W/AUTO DIFF WBC: CPT | Performed by: NURSE PRACTITIONER

## 2024-03-07 PROCEDURE — 90677 PCV20 VACCINE IM: CPT | Performed by: NURSE PRACTITIONER

## 2024-03-07 RX ORDER — PROPRANOLOL HCL 60 MG
CAPSULE, EXTENDED RELEASE 24HR ORAL
COMMUNITY
Start: 2024-03-02

## 2024-03-07 RX ORDER — FAMOTIDINE 40 MG/1
40 TABLET, FILM COATED ORAL AT BEDTIME
Qty: 90 TABLET | Refills: 0 | Status: SHIPPED | OUTPATIENT
Start: 2024-03-07 | End: 2024-07-17

## 2024-03-07 RX ORDER — ARIPIPRAZOLE 30 MG/1
30 TABLET ORAL DAILY
COMMUNITY

## 2024-03-07 SDOH — HEALTH STABILITY: PHYSICAL HEALTH: ON AVERAGE, HOW MANY DAYS PER WEEK DO YOU ENGAGE IN MODERATE TO STRENUOUS EXERCISE (LIKE A BRISK WALK)?: 0 DAYS

## 2024-03-07 ASSESSMENT — PATIENT HEALTH QUESTIONNAIRE - PHQ9
SUM OF ALL RESPONSES TO PHQ QUESTIONS 1-9: 13
SUM OF ALL RESPONSES TO PHQ QUESTIONS 1-9: 13
10. IF YOU CHECKED OFF ANY PROBLEMS, HOW DIFFICULT HAVE THESE PROBLEMS MADE IT FOR YOU TO DO YOUR WORK, TAKE CARE OF THINGS AT HOME, OR GET ALONG WITH OTHER PEOPLE: SOMEWHAT DIFFICULT

## 2024-03-07 ASSESSMENT — PAIN SCALES - GENERAL: PAINLEVEL: NO PAIN (0)

## 2024-03-07 ASSESSMENT — SOCIAL DETERMINANTS OF HEALTH (SDOH): HOW OFTEN DO YOU GET TOGETHER WITH FRIENDS OR RELATIVES?: PATIENT DECLINED

## 2024-03-07 NOTE — PATIENT INSTRUCTIONS
Preventive Care Advice   This is general advice given by our system to help you stay healthy. However, your care team may have specific advice just for you. Please talk to your care team about your preventive care needs.  Nutrition  Eat 5 or more servings of fruits and vegetables each day.  Try wheat bread, brown rice and whole grain pasta (instead of white bread, rice, and pasta).  Get enough calcium and vitamin D. Check the label on foods and aim for 100% of the RDA (recommended daily allowance).  Lifestyle  Exercise at least 150 minutes each week   (30 minutes a day, 5 days a week).  Do muscle strengthening activities 2 days a week. These help control your weight and prevent disease.  No smoking.  Wear sunscreen to prevent skin cancer.  Have a dental exam and cleaning every 6 months.  Yearly exams  See your health care team every year to talk about:  Any changes in your health.  Any medicines your care team has prescribed.  Preventive care, family planning, and ways to prevent chronic diseases.  Shots (vaccines)   HPV shots (up to age 26), if you've never had them before.  Hepatitis B shots (up to age 59), if you've never had them before.  COVID-19 shot: Get this shot when it's due.  Flu shot: Get a flu shot every year.  Tetanus shot: Get a tetanus shot every 10 years.  Pneumococcal, hepatitis A, and RSV shots: Ask your care team if you need these based on your risk.  Shingles shot (for age 50 and up).  General health tests  Diabetes screening:  Starting at age 35, Get screened for diabetes at least every 3 years.  If you are younger than age 35, ask your care team if you should be screened for diabetes.  Cholesterol test: At age 39, start having a cholesterol test every 5 years, or more often if advised.  Bone density scan (DEXA): At age 50, ask your care team if you should have this scan for osteoporosis (brittle bones).  Hepatitis C: Get tested at least once in your life.  STIs (sexually transmitted  infections)  Before age 24: Ask your care team if you should be screened for STIs.  After age 24: Get screened for STIs if you're at risk. You are at risk for STIs (including HIV) if:  You are sexually active with more than one person.  You don't use condoms every time.  You or a partner was diagnosed with a sexually transmitted infection.  If you are at risk for HIV, ask about PrEP medicine to prevent HIV.  Get tested for HIV at least once in your life, whether you are at risk for HIV or not.  Cancer screening tests  Cervical cancer screening: If you have a cervix, begin getting regular cervical cancer screening tests at age 21. Most people who have regular screenings with normal results can stop after age 65. Talk about this with your provider.  Breast cancer scan (mammogram): If you've ever had breasts, begin having regular mammograms starting at age 40. This is a scan to check for breast cancer.  Colon cancer screening: It is important to start screening for colon cancer at age 45.  Have a colonoscopy test every 10 years (or more often if you're at risk) Or, ask your provider about stool tests like a FIT test every year or Cologuard test every 3 years.  To learn more about your testing options, visit: https://www.Betable/885895.pdf.  For help making a decision, visit: https://bit.ly/lq59603.  Prostate cancer screening test: If you have a prostate and are age 55 to 69, ask your provider if you would benefit from a yearly prostate cancer screening test.  Lung cancer screening: If you are a current or former smoker age 50 to 80, ask your care team if ongoing lung cancer screenings are right for you.  For informational purposes only. Not to replace the advice of your health care provider. Copyright   2023 [employer]. All rights reserved. Clinically reviewed by the [facility]. [employer] 812706 - REV 01/24.    Quitting Tobacco: Care Instructions  Quitting tobacco is much  harder than simply changing a habit. Nicotine cravings make it hard to quit, but you can do it. Your doctor will help you set up the plan that best meets your needs.    You will miss the nicotine at first. You may feel short-tempered and grumpy. You may have trouble sleeping or thinking clearly. The urge to use tobacco may continue for a time.   Combining tools can raise your chances of success. You can use medicine along with counseling. And you can join a quit-tobacco program, such as the American Lung Association's Freedom from Smoking program.     Get support.  Reach out to family and friends, and find a counselor to help you quit. Join a support group, such as Nicotine Anonymous. Go to www.smokefree.gov to sign up for text messaging support.     Talk to your doctor or pharmacist about medicines that can help you quit.  Medicines can help with cravings and withdrawal symptoms. There are several over-the-counter choices, such as nicotine patches, gum, and lozenges.     After you quit, do not use tobacco again, not even once.  Get rid of all tobacco products and anything that reminds you of tobacco, such as ashtrays.     Avoid things that make you reach for tobacco.  Change your daily routine. Take a different route to work, or eat a meal in a different place.     Try to cut down on stress.  Find ways to calm yourself, such as taking a hot bath or doing deep breathing exercises.     Eat a healthy diet, and get regular exercise.  Having healthy habits may help you quit using tobacco.     Don't give up on quitting if you use tobacco again.  Most people quit and restart a few times before they quit for good.   Follow-up care is a key part of your treatment and safety. Be sure to make and go to all appointments, and call your doctor if you are having problems. It's also a good idea to know your test results and keep a list of the medicines you take.  Where can you learn more?  Go to  "https://www.[employer].net/patiented  Enter Y522 in the search box to learn more about \"Quitting Tobacco: Care Instructions.\"  Current as of: March 21, 2023               Content Version: 13.8    4341-2176 [employer].   Care instructions adapted under license by your healthcare professional. If you have questions about a medical condition or this instruction, always ask your healthcare professional. [employer] disclaims any warranty or liability for your use of this information.      Deciding About Using Medicines To Quit Smoking  How can you decide about using medicines to quit smoking?  What are the medicines you can use?  Your doctor may prescribe varenicline (Chantix) or bupropion SR. These medicines can help you cope with cravings for tobacco. They are pills that don't contain nicotine.  You also can use nicotine replacement products. These do contain nicotine. There are many types.  Gum and lozenges slowly release nicotine into your mouth.  Patches stick to your skin. They slowly release nicotine into your bloodstream.  An inhaler has a yusuf that contains nicotine. You breathe in a puff of nicotine vapor through your mouth and throat.  Nasal spray releases a mist that contains nicotine.  What are key points about this decision?  Using medicines can increase your chances of quitting smoking. They can ease cravings and withdrawal symptoms.  Getting counseling along with using medicine can raise your chances of quitting even more.  These nicotine replacement products have less nicotine than cigarettes. And by itself, nicotine is not nearly as harmful as smoking. The tars, carbon monoxide, and other toxic chemicals in tobacco cause the harmful effects.  The side effects of nicotine replacement products depend on the type of product. For example, a patch can make your skin red and itchy. Medicines in pill form can make you sick to your stomach. They can also cause dry mouth and trouble " "sleeping. For most people, the side effects are not bad enough to make them stop using the products.  Why might you choose to use medicines to quit smoking?  You have tried on your own to stop smoking, but you were not able to stop.  You want to increase your chances of quitting smoking.  You want to reduce your cravings and withdrawal symptoms.  You feel the benefits of medicine outweigh the side effects.  Why might you choose not to use medicine?  You want to try quitting on your own by stopping all at once (\"cold turkey\").  You want to cut back slowly on the number of cigarettes you smoke.  You do not like using medicine.  You feel the side effects of medicines outweigh the benefits.  You are worried about the cost of medicines.  Your decision  Thinking about the facts and your feelings can help you make a decision that is right for you. Be sure you understand the benefits and risks of your options, and think about what else you need to do before you make the decision.  Where can you learn more?  Go to https://www.[employer].net/patiented  Enter K933 in the search box to learn more about \"Deciding About Using Medicines To Quit Smoking.\"  Current as of: March 21, 2023               Content Version: 13.8    8543-7944 [employer].   Care instructions adapted under license by your healthcare professional. If you have questions about a medical condition or this instruction, always ask your healthcare professional. [employer] disclaims any warranty or liability for your use of this information.      Quitting Tobacco: Care Instructions  Quitting tobacco is much harder than simply changing a habit. Nicotine cravings make it hard to quit, but you can do it. Your doctor will help you set up the plan that best meets your needs.    You will miss the nicotine at first. You may feel short-tempered and grumpy. You may have trouble sleeping or thinking clearly. The urge to use tobacco may continue for " "a time.   Combining tools can raise your chances of success. You can use medicine along with counseling. And you can join a quit-tobacco program, such as the American Lung Association's Freedom from Smoking program.     Get support.  Reach out to family and friends, and find a counselor to help you quit. Join a support group, such as Nicotine Anonymous. Go to www.smokefree.gov to sign up for text messaging support.     Talk to your doctor or pharmacist about medicines that can help you quit.  Medicines can help with cravings and withdrawal symptoms. There are several over-the-counter choices, such as nicotine patches, gum, and lozenges.     After you quit, do not use tobacco again, not even once.  Get rid of all tobacco products and anything that reminds you of tobacco, such as ashtrays.     Avoid things that make you reach for tobacco.  Change your daily routine. Take a different route to work, or eat a meal in a different place.     Try to cut down on stress.  Find ways to calm yourself, such as taking a hot bath or doing deep breathing exercises.     Eat a healthy diet, and get regular exercise.  Having healthy habits may help you quit using tobacco.     Don't give up on quitting if you use tobacco again.  Most people quit and restart a few times before they quit for good.   Follow-up care is a key part of your treatment and safety. Be sure to make and go to all appointments, and call your doctor if you are having problems. It's also a good idea to know your test results and keep a list of the medicines you take.  Where can you learn more?  Go to https://www.[employer].net/patiented  Enter Y522 in the search box to learn more about \"Quitting Tobacco: Care Instructions.\"  Current as of: March 21, 2023               Content Version: 13.8    4210-1000 [employer].   Care instructions adapted under license by your healthcare professional. If you have questions about a medical condition or this " "instruction, always ask your healthcare professional. [employer] disclaims any warranty or liability for your use of this information.      Learning About Benefits of Quitting Smoking  Quitting smoking helps your body in many ways. Quitting lowers your risk for cancer, lung disease, heart attack, stroke, blood vessel disease, and blindness. You'll also get sick less often and heal faster. And after you quit, you may find that your mood is better and you are less stressed.  When and how will you feel healthier after quitting smoking?    In the first hours or days:    Your blood pressure and heart rate go down.  Your oxygen levels increase.    Within weeks or months:    You will cough less and breathe deeper. It may be easier to be active.  Your sense of taste and smell should return.    Over the years:    Your risks of heart disease, heart attack, and stroke are lower.  Your risk of lung cancer is cut by about half after about 10 years. And your risk for other cancers is lower too.  How would quitting help others in your life?    Their heart, lung, and cancer risks may drop, much like yours. They will also be sick less.   If you are or will be pregnant someday, quitting smoking means a healthier .   Where can you learn more?  Go to https://www.healthwise.net/patiented  Enter O319 in the search box to learn more about \"Learning About Benefits of Quitting Smoking.\"  Current as of: 2023               Content Version: 13.8    8291-6852 [employer].   Care instructions adapted under license by your healthcare professional. If you have questions about a medical condition or this instruction, always ask your healthcare professional. Healthwise, [employer] disclaims any warranty or liability for your use of this information.      "

## 2024-03-07 NOTE — COMMUNITY RESOURCES LIST (ENGLISH)
03/07/2024    [facility]  N/A  For questions about this resource list or additional care needs, please contact your primary care clinic or care manager.  Phone: [phone]   Email: N/A   Address: [address] [place] [id number]   Hours: N/A        Exercise and Recreation       Gym or workout facility  1  [employer] [facility] [place] [facility]: 2.48 miles      In-Person   [address] [place] [address]  Language: English  Hours: Mon - Sun Open 24 Hours  Fees: Insurance, Self Pay, Sliding Fee   Phone: [phone] Website: [email or web address]     2  [employer] [facility] [place]: 6.67 miles      In-Person   [address] [place] [address]  Language: English  Hours: Mon - Sun Open 24 Hours  Fees: Insurance, Self Pay, Sliding Fee   Phone: [phone] Email: [email or web address] Website: [email or web address]          Important Numbers & Websites       Emergency Services   911  [facility]   311  Poison Control   (400) 694-8440  Suicide Prevention Lifeline   (577) 887-3281 (TALK)  Child Abuse Hotline   (435) 183-1225 (4-A-Child)  Sexual Assault Hotline   (182) 223-5914 (HOPE)  National Runaway Safeline   (210) 957-2865 (RUNAWAY)  All-Options Talkline   (533) 138-3341  Substance Abuse Referral   (753) 818-6658 (HELP)

## 2024-03-07 NOTE — PROGRESS NOTES
Preventive Care Visit  [facility]  ROHIT Kya CNP, Family Medicine  Mar 7, 2024    Assessment & Plan     Routine general medical examination at a health care facility  - PRIMARY CARE FOLLOW-UP SCHEDULING; Future  - REVIEW OF HEALTH MAINTENANCE PROTOCOL ORDERS    Cervical cancer screening  - Pap Screen with HPV - recommended age 30 - 65 years    Subclinical hypothyroidism  -being treated with 25 mcg of levothyroxine due to symptoms of memory issues  - TSH with free T4 reflex    Nausea  -discussed that morning nausea and vomiting is a common symptom of GERD. Patient didn't experience much improvement on omeprazole so recommend trial of famotidine at night. Discussed how quitting smoking will also help with GERD symptoms.  - famotidine (PEPCID) 40 MG tablet; Take 1 tablet (40 mg) by mouth at bedtime for 90 days  - Comprehensive metabolic panel  - CBC with Platelets & Differential    Cigarette nicotine dependence without complication  -not interested in NRT at this time  - Pneumococcal 20 Valent Conjugate (Prevnar 20)    Patient has been advised of split billing requirements and indicates understanding: Yes  Ordering of each unique test  Prescription drug management        Nicotine/Tobacco Cessation  She reports that she has been smoking cigarettes. She has never been exposed to tobacco smoke. She has never used smokeless tobacco.  Nicotine/Tobacco Cessation Plan  Information offered: Patient not interested at this time      Counseling  Appropriate preventive services were discussed with this patient, including applicable screening as appropriate for fall prevention, nutrition, physical activity, Tobacco-use cessation, weight loss and cognition.  Checklist reviewing preventive services available has been given to the patient.  Reviewed patient's diet, addressing concerns and/or questions.   The patient was instructed to see the dentist every 6 months.   The patient's PHQ-9 score is  consistent with moderate depression. She was provided with information regarding depression.           Alicia Morris is a 34 year old, presenting for the following:  Physical        3/7/2024     7:15 AM   Additional Questions   Roomed by Vaishali   Accompanied by Self        Health Care Directive  Patient does not have a Health Care Directive or Living Will: Discussed advance care planning with patient; information given to patient to review.    Patient here for yearly preventative care visit. In addition, they have the following concerns:    1. Throwing up half the days in the week when waking up in the morning. Emesis is phlegm. Has not tried any medications before. No stomach pain no blood. Has not noticed if any particular foods affect it. Tried omeprazole but would still throw up. No sore thorat in the morning.     Has been out of the levothyroxine for a couple months.       Marijuana: few times day, helps the nausea    [facility]    1 drnk night 2-3 x wek    No regular exercise            3/7/2024   General Health   How would you rate your overall physical health? Good   Feel stress (tense, anxious, or unable to sleep) Rather much   (!) STRESS CONCERN      3/7/2024   Nutrition   Three or more servings of calcium each day? (!) NO   Diet: Regular (no restrictions)   How many servings of fruit and vegetables per day? (!) 2-3   How many sweetened beverages each day? 0-1         3/7/2024   Exercise   Days per week of moderate/strenous exercise 0 days   (!) EXERCISE CONCERN      3/7/2024   Social Factors   Frequency of gathering with friends or relatives Patient declined   Worry food won't last until get money to buy more No   Food not last or not have enough money for food? No   Do you have housing?  Yes   Are you worried about losing your housing? No   Lack of transportation? No   Unable to get utilities (heat,electricity)? No         3/7/2024   Dental   Dentist two times every year? (!) NO          "3/7/2024   TB Screening   Were you born outside of [place]?  No       Today's PHQ-9 Score:       3/7/2024     7:06 AM   PHQ-9 SCORE   PHQ-9 Total Score MyChart 13 (Moderate depression)   PHQ-9 Total Score 13         3/7/2024   Substance Use   If I could quit smoking, I would Completely agree   I want to quit somking, worry about health affects Completely agree   Willing to make a plan to quit smoking Completely agree   Willing to cut down before quitting Completely agree   Alcohol more than 3/day or more than 7/wk No   Do you use any other substances recreationally? (!) ALCOHOL    (!) CANNABIS PRODUCTS    (!) DECLINE     Social History     Tobacco Use    Smoking status: Every Day     Packs/day: 0     Types: Cigarettes     Passive exposure: Never    Smokeless tobacco: Never   Vaping Use    Vaping Use: Never used   Substance Use Topics    Alcohol use: Yes     Comment: 2 - 3x/week.    Drug use: No             3/7/2024   Breast Cancer Screening   Family history of breast, colon, or ovarian cancer? No / Unknown      Mammogram Screening - Patient under 40 years of age: Routine Mammogram Screening not recommended.           3/7/2024   One time HIV Screening   Previous HIV test? Yes         3/7/2024   STI Screening   New sexual partner(s) since last STI/HIV test? No     History of abnormal Pap smear: NO - age 30-65 PAP every 5 years with negative HPV co-testing recommended             3/7/2024   Contraception/Family Planning   Questions about contraception or family planning No       Reviewed and updated as needed this visit by Provider   Tobacco  Allergies  Meds   Med Hx  Surg Hx  Fam Hx  Soc Hx Sexual   Activity                   Objective    Exam  /81   Pulse 64   Temp 97.4  F (36.3  C) (Tympanic)   Resp 18   Ht 1.49 m (4' 10.66\")   Wt 51.3 kg (113 lb)   LMP 02/15/2024 (Exact Date)   SpO2 100%   BMI 23.09 kg/m     Estimated body mass index is 23.09 kg/m  as calculated from the following:    Height as of " "this encounter: 1.49 m (4' 10.66\").    Weight as of this encounter: 51.3 kg (113 lb).    Physical Exam  Exam conducted with a chaperone present.   Constitutional:       Appearance: Normal appearance. She is not ill-appearing.   HENT:      Right Ear: Tympanic membrane, ear canal and external ear normal.      Left Ear: Tympanic membrane, ear canal and external ear normal.      Nose: Nose normal. No congestion.      Mouth/Throat:      Mouth: Mucous membranes are moist.      Pharynx: Oropharynx is clear.   Eyes:      Pupils: Pupils are equal, round, and reactive to light.   Cardiovascular:      Rate and Rhythm: Normal rate and regular rhythm.      Pulses: Normal pulses.      Heart sounds: Normal heart sounds. No murmur heard.     No friction rub. No gallop.   Pulmonary:      Effort: Pulmonary effort is normal.      Breath sounds: Normal breath sounds.   Abdominal:      General: Abdomen is flat. Bowel sounds are normal.      Palpations: Abdomen is soft.   Genitourinary:     General: Normal vulva.      Labia:         Right: No rash, tenderness, lesion or injury.         Left: No rash, tenderness, lesion or injury.       Vagina: Normal. No vaginal discharge.      Cervix: Normal.   Musculoskeletal:         General: Normal range of motion.      Cervical back: Normal range of motion.   Lymphadenopathy:      Cervical: No cervical adenopathy.   Skin:     General: Skin is warm and dry.   Neurological:      General: No focal deficit present.      Mental Status: She is alert and oriented to person, place, and time.   Psychiatric:         Mood and Affect: Mood normal.         Behavior: Behavior normal.         Thought Content: Thought content normal.         Judgment: Judgment normal.             Signed Electronically by: ROHIT Kay CNP    Answers submitted by the patient for this visit:  Patient Health Questionnaire (Submitted on 3/7/2024)  If you checked off any problems, how difficult have these problems made it for " you to do your work, take care of things at home, or get along with other people?: Somewhat difficult  PHQ9 TOTAL SCORE: 13

## 2024-03-11 LAB
BKR LAB AP GYN ADEQUACY: NORMAL
BKR LAB AP GYN INTERPRETATION: NORMAL
BKR LAB AP HPV REFLEX: NORMAL
BKR LAB AP PREVIOUS ABNORMAL: NORMAL
PATH REPORT.COMMENTS IMP SPEC: NORMAL
PATH REPORT.COMMENTS IMP SPEC: NORMAL
PATH REPORT.RELEVANT HX SPEC: NORMAL

## 2024-03-12 LAB
HUMAN PAPILLOMA VIRUS 16 DNA: NEGATIVE
HUMAN PAPILLOMA VIRUS 18 DNA: NEGATIVE
HUMAN PAPILLOMA VIRUS FINAL DIAGNOSIS: NORMAL
HUMAN PAPILLOMA VIRUS OTHER HR: NEGATIVE

## 2024-07-17 DIAGNOSIS — R11.0 NAUSEA: ICD-10-CM

## 2024-07-17 RX ORDER — FAMOTIDINE 40 MG/1
40 TABLET, FILM COATED ORAL AT BEDTIME
Qty: 90 TABLET | Refills: 2 | Status: SHIPPED | OUTPATIENT
Start: 2024-07-17 | End: 2025-04-13

## 2024-07-31 ENCOUNTER — TRANSCRIBE ORDERS (OUTPATIENT)
Dept: OTHER | Age: 34
End: 2024-07-31

## 2024-07-31 DIAGNOSIS — M54.9 BACK PAIN AFFECTING PREGNANCY IN SECOND TRIMESTER: Primary | ICD-10-CM

## 2024-07-31 DIAGNOSIS — O99.891 BACK PAIN AFFECTING PREGNANCY IN SECOND TRIMESTER: Primary | ICD-10-CM

## 2024-07-31 DIAGNOSIS — R10.2 PELVIC PAIN AFFECTING PREGNANCY IN SECOND TRIMESTER, ANTEPARTUM: ICD-10-CM

## 2024-07-31 DIAGNOSIS — O26.892 PREGNANCY RELATED HIP PAIN IN SECOND TRIMESTER, ANTEPARTUM: ICD-10-CM

## 2024-07-31 DIAGNOSIS — M25.559 PREGNANCY RELATED HIP PAIN IN SECOND TRIMESTER, ANTEPARTUM: ICD-10-CM

## 2024-07-31 DIAGNOSIS — O26.892 PELVIC PAIN AFFECTING PREGNANCY IN SECOND TRIMESTER, ANTEPARTUM: ICD-10-CM

## 2024-09-24 NOTE — PROGRESS NOTES
PHYSICAL THERAPY EVALUATION  Type of Visit: Evaluation              Patient Name: Alexandra Jordan, 34 year old, female  Todays Date: Sep 26, 2024    Referral Diagnosis:  Back pain affecting pregnancy in second trimester; Pregnancy related hip p*    Relevant Medical History:   Patient Active Problem List   Diagnosis    Psoriasis    CARDIOVASCULAR SCREENING; LDL GOAL LESS THAN 160    Lumbosacral radiculopathy at L5, right    Endometriosis    Seasonal allergies    Tobacco use    Subclinical hypothyroidism    Bipolar disorder (H)    PTSD (post-traumatic stress disorder)    JANINE (generalized anxiety disorder)    Severe major depression without psychotic features (H)    Daytime sleepiness    Right lumbar radiculopathy    Myofascial pain    Drug-induced nausea and vomiting    Gastroesophageal reflux disease, esophagitis presence not specified    Back pain affecting pregnancy in second trimester    Pregnancy related hip pain in second trimester, antepartum       Subjective:    Presenting condition or subjective complaint:      No hx of PT in the past. She notes OB is at [facility].     Pt is 29 weeks this week.     Pt states this pregnancy experiencing pain in the groin, deep in the hips, and R upper butt when she is sitting, and then now recently pain in the lower back.     She notes gradual on set moreso into 2nd trimester. She notes it is hard to tell if pain is radiating down legs since both legs and back hurt.     She notes no urinary leakage currently. She notes her BP overall has been really good. She notes having pre-eclamsia in first pregnancy but doing low does tylenol which is going well.     She is planning to do another C section. She notes she felt like she recovered well and quickly from first C section.   She notes some pain in the lower abdomen area.     Mechanism of Injury/Issue: pregnancy     Prior therapy history for the same diagnosis, illness or injury:    none     Other related Services:  None      Effects on Sleep: Sleep has petrona good overall     Aggravating Factors: Walking, standing, sitting  (butt pain), stairs,     Easing Factors: Sitting down, resting, putting feet up       Pregnancy and Birth History:    Obstetric History    G  2 and P  1    Number of C-sections: 1    Number vaginal delivery: 0    Birth Injury? C Section     Hormone Medications: none    Pain with Pelvic Exams: Notesdisomcfort      Regular Menstrual Cycle: was regular before pregnancy       Pelvic Floor Complaints    Urinary Incontinence: Urinary Leakage: No    Urinary Urgency/Frequency: No-Pt is capable of making it to the toilet without leaking urine     Voiding Habits: Voids Per Day: Endorses a lot but varies  Night time Voids: 1-2  only with pregnancy     Voiding Symptoms: Hesitancy, Slow stream, Need to immediately revoid, Postmicturition leakage Maladaptive Bladder Habits: Maladaptive Bladder Habits: Just in Case Voids      Fluid intake per day: 3 20 ounce water bottles, 1-2 glasses of milk    Caffeine: 1 cup of coffee  Carbonated Beverages: 1 can of soda   Alcohol: none          Sexual Function History:    Sexually active:  Yes      Pain with Penetration: Yes: Marinoff Scale: 1- Discomfort that does not affect completion     Pain is with: Initial, not as much pain before pregnancy     Pain with Tampon Use: None in the past     Pelvic Floor Heaviness: No  Prolapse Symptoms: No symptoms\     Does endorse some pressure deeper     Bowel History:  Frequency of bowel movement:  1 x/day    Consistency of stool:  Type 1: Separate hard lumps, like nuts (hard to pass) Type 2: Sausage-shaped, but lumpy      Fecal Incontinence : No    Other bowel issues:  None,       Occupation: senior , 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  Opportunity provided to ask questions and verbal consent for assessment and intervention was given.      Objective:     Lumbar ROM:     Lumbar Flexion 100 %  felt in the lower back    Lumbar Extension 100 % felt in lower back coming back      Left AROM Right AROM   Lumbar Sidebend Within Normal Limits just stretching sensation Within Normal Limits, stretching sensation    Lumbar Rotation Within Normal Limits, no pain  Within Normal Limits, no pain      Standing March:   Minimal pain in pubic bone, some pain in lower back ,    PELVIC EXAM    External Visual Inspection:      Active Pelvic Floor Muscles:    Voluntary contraction: Present, anal area and vaginal area activate separately     Voluntary Relaxation: Absent and Not Tested     Cough( Involuntary Contraction): Not Tested       Scar:   Location/Type: C section scar   Mobility: WNL    Internal Digital Palpation:    No internal performed       Assessment & Plan   CLINICAL IMPRESSIONS  Medical Diagnosis: Back pain affecting pregnancy in second trimester    Treatment Diagnosis: Back pain, pubic bone pain, hip pain   Impression/Assessment: Patient is a 34 year old female with lower back pain, hip pain, pubic bone pain during pregnancy complaints.  The following significant findings have been identified: Pain, Decreased joint mobility, Impaired muscle performance, and Decreased activity tolerance. These impairments interfere with their ability to perform self care tasks, work tasks, recreational activities, household chores, household mobility, and community mobility as compared to previous level of function.     Clinical Decision Making (Complexity):  Clinical Presentation: Evolving/Changing  Clinical Presentation Rationale: based on  medical and personal factors listed in PT evaluation  Clinical Decision Making (Complexity): Moderate complexity    PLAN OF CARE  Treatment Interventions:  Interventions: Gait Training, Manual Therapy, Neuromuscular Re-education, Therapeutic Activity, Therapeutic Exercise, Self-Care/Home Management    Long Term Goals     PT Goal 1  Goal Identifier: Walking and Sitting  Goal Description: Pt will be able to walk and sit for 45 min with minimal symptoms  Rationale: to maximize safety and independence with performance of ADLs and functional tasks;to maximize safety and independence within the home;to maximize safety and independence within the community;to maximize safety and independence with self cares  Target Date: 12/25/24      Frequency of Treatment: 1x a week for 4-5 weeks then monthly follow ups  Duration of Treatment: 90 days    Recommended Referrals to Other Professionals:  None  Education Assessment:        Risks and benefits of evaluation/treatment have been explained.   Patient/Family/caregiver agrees with Plan of Care.     Evaluation Time:     PT Eval, Moderate Complexity Minutes (43512): 45       Signing Clinician: Min Watts PT

## 2024-09-26 ENCOUNTER — THERAPY VISIT (OUTPATIENT)
Dept: PHYSICAL THERAPY | Facility: CLINIC | Age: 34
End: 2024-09-26
Payer: COMMERCIAL

## 2024-09-26 DIAGNOSIS — M25.559 PREGNANCY RELATED HIP PAIN IN SECOND TRIMESTER, ANTEPARTUM: ICD-10-CM

## 2024-09-26 DIAGNOSIS — O26.892 PELVIC PAIN AFFECTING PREGNANCY IN SECOND TRIMESTER, ANTEPARTUM: ICD-10-CM

## 2024-09-26 DIAGNOSIS — O99.891 BACK PAIN AFFECTING PREGNANCY IN SECOND TRIMESTER: ICD-10-CM

## 2024-09-26 DIAGNOSIS — M54.9 BACK PAIN AFFECTING PREGNANCY IN SECOND TRIMESTER: ICD-10-CM

## 2024-09-26 DIAGNOSIS — R10.2 PELVIC PAIN AFFECTING PREGNANCY IN SECOND TRIMESTER, ANTEPARTUM: ICD-10-CM

## 2024-09-26 DIAGNOSIS — O26.892 PREGNANCY RELATED HIP PAIN IN SECOND TRIMESTER, ANTEPARTUM: ICD-10-CM

## 2024-09-26 PROCEDURE — 97162 PT EVAL MOD COMPLEX 30 MIN: CPT | Mod: GP

## 2024-09-26 PROCEDURE — 97110 THERAPEUTIC EXERCISES: CPT | Mod: GP

## 2024-10-10 ENCOUNTER — THERAPY VISIT (OUTPATIENT)
Dept: PHYSICAL THERAPY | Facility: CLINIC | Age: 34
End: 2024-10-10
Payer: COMMERCIAL

## 2024-10-10 DIAGNOSIS — O99.891 BACK PAIN AFFECTING PREGNANCY IN SECOND TRIMESTER: Primary | ICD-10-CM

## 2024-10-10 DIAGNOSIS — O26.892 PREGNANCY RELATED HIP PAIN IN SECOND TRIMESTER, ANTEPARTUM: ICD-10-CM

## 2024-10-10 DIAGNOSIS — M54.9 BACK PAIN AFFECTING PREGNANCY IN SECOND TRIMESTER: Primary | ICD-10-CM

## 2024-10-10 DIAGNOSIS — M25.559 PREGNANCY RELATED HIP PAIN IN SECOND TRIMESTER, ANTEPARTUM: ICD-10-CM

## 2024-10-10 PROCEDURE — 97140 MANUAL THERAPY 1/> REGIONS: CPT | Mod: GP

## 2024-10-10 PROCEDURE — 97110 THERAPEUTIC EXERCISES: CPT | Mod: GP

## 2024-10-23 ENCOUNTER — OFFICE VISIT (OUTPATIENT)
Dept: URGENT CARE | Facility: URGENT CARE | Age: 34
End: 2024-10-23
Payer: COMMERCIAL

## 2024-10-23 VITALS
TEMPERATURE: 97.5 F | SYSTOLIC BLOOD PRESSURE: 123 MMHG | RESPIRATION RATE: 24 BRPM | DIASTOLIC BLOOD PRESSURE: 79 MMHG | BODY MASS INDEX: 26.85 KG/M2 | HEART RATE: 84 BPM | WEIGHT: 131.4 LBS | OXYGEN SATURATION: 99 %

## 2024-10-23 DIAGNOSIS — J06.9 UPPER RESPIRATORY TRACT INFECTION, UNSPECIFIED TYPE: Primary | ICD-10-CM

## 2024-10-23 LAB
FLUAV AG SPEC QL IA: NEGATIVE
FLUBV AG SPEC QL IA: NEGATIVE

## 2024-10-23 PROCEDURE — 87635 SARS-COV-2 COVID-19 AMP PRB: CPT | Performed by: INTERNAL MEDICINE

## 2024-10-23 PROCEDURE — 99213 OFFICE O/P EST LOW 20 MIN: CPT | Performed by: INTERNAL MEDICINE

## 2024-10-23 PROCEDURE — 87804 INFLUENZA ASSAY W/OPTIC: CPT | Performed by: INTERNAL MEDICINE

## 2024-10-23 RX ORDER — CERTOLIZUMAB PEGOL 200 MG/ML
INJECTION, SOLUTION SUBCUTANEOUS
COMMUNITY
Start: 2024-07-19

## 2024-10-23 RX ORDER — FLUTICASONE PROPIONATE 50 MCG
2 SPRAY, SUSPENSION (ML) NASAL DAILY
Qty: 16 G | Refills: 0 | Status: SHIPPED | OUTPATIENT
Start: 2024-10-23

## 2024-10-23 NOTE — PROGRESS NOTES
SUBJECTIVE:  Alexandra Jordan is an 34 year old female who presents for not feeling well.  For two days.  Nasal congestion, sinus pressure.  No cough.  No sore throat.  Took dayquil and nyquil which helps a little.  No ear pain.  No n/v/d.  Is pregnant about 33 weeks.  No recent travel.  Dtr was sick but recovered.  No fevers.    PMH:   has no past medical history on file.  Patient Active Problem List   Diagnosis    Psoriasis    CARDIOVASCULAR SCREENING; LDL GOAL LESS THAN 160    Lumbosacral radiculopathy at L5, right    Endometriosis    Seasonal allergies    Tobacco use    Subclinical hypothyroidism    Bipolar disorder (H)    PTSD (post-traumatic stress disorder)    JANINE (generalized anxiety disorder)    Severe major depression without psychotic features (H)    Daytime sleepiness    Right lumbar radiculopathy    Myofascial pain    Drug-induced nausea and vomiting    Gastroesophageal reflux disease, esophagitis presence not specified    Back pain affecting pregnancy in second trimester    Pregnancy related hip pain in second trimester, antepartum     Social History     Socioeconomic History    Marital status:      Spouse name: None    Number of children: None    Years of education: None    Highest education level: None   Tobacco Use    Smoking status: Former     Types: Cigarettes     Passive exposure: Never    Smokeless tobacco: Never   Vaping Use    Vaping status: Never Used   Substance and Sexual Activity    Alcohol use: Yes     Comment: 2 - 3x/week.    Drug use: No    Sexual activity: Yes     Partners: Male     Birth control/protection: Condom   Social History Narrative    ** Merged History Encounter **          Social Drivers of Health     Financial Resource Strain: Low Risk  (10/16/2024)    [facility]    Financial Resource Strain     Difficulty of Paying Living Expenses: 3   Food Insecurity: No Food Insecurity (10/18/2024)    Received from [employer]  [facility] Replaced by [facility]    Food Insecurity     Do you worry your food will run out before you are able to buy more?: 1   Transportation Needs: No Transportation Needs (10/16/2024)    Received from [facility] Replaced by [facility]    Transportation Needs     Lack of Transportation (Medical): 1   Physical Activity: Unknown (3/7/2024)    Exercise Vital Sign     Days of Exercise per Week: 0 days   Stress: Stress Concern Present (3/7/2024)    [employer] [place] [employer] - Occupational Stress Questionnaire     Feeling of Stress : Rather much   Social Connections: Socially Integrated (10/16/2024)    Received from [facility] Replaced by [facility]    Social Connections     Frequency of Communication with Friends and Family: 0   Interpersonal Safety: Low Risk  (3/7/2024)    Interpersonal Safety     Do you feel physically and emotionally safe where you currently live?: Yes     Within the past 12 months, have you been hit, slapped, kicked or otherwise physically hurt by someone?: No     Within the past 12 months, have you been humiliated or emotionally abused in other ways by your partner or ex-partner?: No   Housing Stability: Low Risk  (10/18/2024)    Received from [employer] Replaced by [facility]    Housing Stability     What is your housing situation today?: 1     Family History   Problem Relation Age of Onset    Hypertension Mother     Colon Polyps Mother     Diabetes Maternal Grandmother     Hypertension Maternal Grandfather     Alcohol/Drug Paternal Grandmother        ALLERGIES:  Estradiol and Morphine    Current Outpatient Medications   Medication Sig Dispense Refill    CIMZIA, 2 SYRINGE, 200 MG/ML PSKT 2 syringes/kit Inject subcutaneously.      fluticasone (FLONASE) 50 MCG/ACT nasal spray Spray 2 sprays into both nostrils daily. 16 g 0    ARIPiprazole (ABILIFY) 30 MG tablet Take 30 mg by mouth daily      famotidine (PEPCID) 40 MG tablet Take 1 tablet (40 mg) by  mouth at bedtime for 270 days (Patient not taking: Reported on 10/23/2024) 90 tablet 2    levothyroxine (SYNTHROID/LEVOTHROID) 25 MCG tablet TAKE 1 TABLET(25 MCG) BY MOUTH DAILY 90 tablet 1    propranolol ER (INDERAL LA) 60 MG 24 hr capsule        No current facility-administered medications for this visit.         ROS:  ROS is done and is negative for general/constitutional, eye, ENT, Respiratory, cardiovascular, GI, , Skin, musculoskeletal except as noted elsewhere.  All other review of systems negative except as noted elsewhere.      OBJECTIVE:  /79 (BP Location: Left arm, Patient Position: Sitting, Cuff Size: Adult Regular)   Pulse 84   Temp 97.5  F (36.4  C) (Tympanic)   Resp 24   Wt 59.6 kg (131 lb 6.4 oz)   LMP 02/15/2024 (Exact Date)   SpO2 99%   BMI 26.85 kg/m    GENERAL APPEARANCE: Alert, in no acute distress  EYES: normal  EARS: External ears normal. Canals clear. TM's normal.  NOSE:mild clear discharge and mildly inflamed mucosa  OROPHARYNX:normal  NECK:No adenopathy,masses or thyromegaly  RESP: normal and clear to auscultation  CV:regular rate and rhythm and no murmurs, clicks, or gallops  ABDOMEN: Pregnant.  Abdomen soft, non-tender. BS normal. No masses, organomegaly  SKIN: no ulcers, lesions or rash  MUSCULOSKELETAL:Musculoskeletal normal      RESULTS  Results for orders placed or performed in visit on 10/23/24   Influenza A & B Antigen - Clinic Collect     Status: Normal    Specimen: Nasopharyngeal; Swab   Result Value Ref Range    Influenza A antigen Negative Negative    Influenza B antigen Negative Negative    Narrative    Test results must be correlated with clinical data. If necessary, results should be confirmed by a molecular assay or viral culture.   .  No results found for this or any previous visit (from the past 48 hours).    ASSESSMENT/PLAN:    ASSESSMENT / PLAN:  (J06.9) Upper respiratory tract infection, unspecified type  (primary encounter diagnosis)  Comment: currently  c/with viral etiology.  Covid pending  Plan: Influenza A & B Antigen - Clinic Collect,         Symptomatic COVID-19 Virus (Coronavirus) by PCR        Nasopharyngeal, fluticasone (FLONASE) 50         MCG/ACT nasal spray        If covid pending pt to seek treatment as is pregnant.  Flonase prn for sxs.  Also advised sinus rinses.  I reviewed supportive care, otc meds to use if needed, expected course, and signs of concern.  Follow up as needed or if does not improve within 1 week(s) or if worsens in any way.  Reviewed red flag symptoms and is to go to the ER if experiences any of these.    See [facility] for orders, medications, letters, patient instructions    Maria Antonia Bojorquez M.D.

## 2024-10-24 LAB — SARS-COV-2 RNA RESP QL NAA+PROBE: NEGATIVE

## 2025-02-03 ENCOUNTER — MEDICAL CORRESPONDENCE (OUTPATIENT)
Dept: HEALTH INFORMATION MANAGEMENT | Facility: CLINIC | Age: 35
End: 2025-02-03
Payer: COMMERCIAL

## 2025-03-24 ENCOUNTER — OFFICE VISIT (OUTPATIENT)
Dept: URGENT CARE | Facility: URGENT CARE | Age: 35
End: 2025-03-24
Payer: COMMERCIAL

## 2025-03-24 VITALS
SYSTOLIC BLOOD PRESSURE: 119 MMHG | RESPIRATION RATE: 18 BRPM | HEART RATE: 78 BPM | TEMPERATURE: 98 F | OXYGEN SATURATION: 97 % | DIASTOLIC BLOOD PRESSURE: 78 MMHG

## 2025-03-24 DIAGNOSIS — R07.0 THROAT PAIN: ICD-10-CM

## 2025-03-24 DIAGNOSIS — J02.9 ACUTE PHARYNGITIS, UNSPECIFIED ETIOLOGY: Primary | ICD-10-CM

## 2025-03-24 LAB
DEPRECATED S PYO AG THROAT QL EIA: NEGATIVE
S PYO DNA THROAT QL NAA+PROBE: NOT DETECTED

## 2025-03-24 PROCEDURE — 87651 STREP A DNA AMP PROBE: CPT | Performed by: FAMILY MEDICINE

## 2025-03-24 PROCEDURE — 3074F SYST BP LT 130 MM HG: CPT | Performed by: FAMILY MEDICINE

## 2025-03-24 PROCEDURE — 3078F DIAST BP <80 MM HG: CPT | Performed by: FAMILY MEDICINE

## 2025-03-24 PROCEDURE — 99213 OFFICE O/P EST LOW 20 MIN: CPT | Performed by: FAMILY MEDICINE

## 2025-03-24 RX ORDER — RISANKIZUMAB-RZAA 150 MG/ML
150 INJECTION SUBCUTANEOUS ONCE
COMMUNITY
Start: 2025-02-05

## 2025-03-24 NOTE — PROGRESS NOTES
Assessment & Plan       ICD-10-CM    1. Acute pharyngitis, unspecified etiology  J02.9       2. Throat pain  R07.0 Streptococcus A Rapid Screen w/Reflex to PCR - Clinic Collect     Group A Streptococcus PCR Throat Swab         Her rapid test is negative.  Will notify if her PCR is positive and treat.  We discussed that there are numerous causes for irritated throat even with white spots despite a negative strep test.  We discussed supportive cares at length.  Discussed warm salt water gargles, humidifier, fluids, Tylenol or ibuprofen as needed for pain, and I  recommend chloroceptic spray for throat discomfort. Advise return if symptoms worsen or persist beyond a week for further evaluation, including potential mononucleosis testing.    Fatigue    Reports fatigue since the onset of the sore throat. It is not severe enough to suggest mononucleosis, which is less likely due to age. Viral infections can cause fatigue, expected to improve as the infection resolves. Consider mono testing if symptoms persist or worsen. Advise return if fatigue worsens or persists beyond a week.    See patient instructions:    Patient Instructions   Please drink plenty of fluids to stay well hydrated.      Use acetaminophen (Tylenol and other brands) or ibuprofen (Advil, Motrin or other brands) as needed for fever or pain.      Gargle with warm salt water, use chloraseptic throat spray, drink warm tea with honey, and use a humidifier if needed for dryness, especially at night.     We will also notify you IF your strep PCR backup test comes back positive, and you will need treatment for that.        Mitzy Santiago MD  [facility]    Alicia Morris is a 35 year old female who presents to clinic today for the following health issues:  Chief Complaint   Patient presents with    Pharyngitis     Sore throat, white in back of throat-wants strep test. Sx started Friday     HPI    See notes above.    Here with her baby who is also sick with viral symptoms.   The patient has been experiencing a sore throat since Friday, describing it as 'white' and 'yucky looking.' She is concerned about the possibility of strep throat.     In addition to the sore throat, she has been feeling fatigued since its onset. Not severe, just more tired than usual.     She mentions having a cough 'here and there' but has no shortness of breath, ear pain, or fever.    7 pt ROS is otherwise negative except as noted in HPI.      Objective    /78   Pulse 78   Temp 98  F (36.7  C) (Oral)   Resp 18   SpO2 97%   Breastfeeding Yes   Physical Exam   Vitals noted.  Patient alert, oriented, and in no acute distress.   Ears:  Canals clear, TM's nl bilaterally.  No erythema or fluid.   Eyes:  bright without discharge or injection. PER and EOMI.    Nares patent without inflammation or drainage.   Oral:  Oropharynx  shows mild erythema, very slight mucusy exudate, no mass or other lesions.   Neck:  Supple without lymphadenopathy, JVD or masses.   CV:  RRR without murmur.   Respiratory:  Lungs clear to auscultation bilaterally.        Results for orders placed or performed in visit on 03/24/25   Streptococcus A Rapid Screen w/Reflex to PCR - Clinic Collect     Status: Normal    Specimen: Throat; Swab   Result Value Ref Range    Group A Strep antigen Negative Negative   Rapid strep negative, PCR pending.

## 2025-03-24 NOTE — PATIENT INSTRUCTIONS
Please drink plenty of fluids to stay well hydrated.      Use acetaminophen (Tylenol and other brands) or ibuprofen (Advil, Motrin or other brands) as needed for fever or pain.      Gargle with warm salt water, use chloraseptic throat spray, drink warm tea with honey, and use a humidifier if needed for dryness, especially at night.     We will also notify you IF your strep PCR backup test comes back positive, and you will need treatment for that.    
IV discontinued, cath removed intact

## 2025-04-12 SDOH — HEALTH STABILITY: PHYSICAL HEALTH: ON AVERAGE, HOW MANY DAYS PER WEEK DO YOU ENGAGE IN MODERATE TO STRENUOUS EXERCISE (LIKE A BRISK WALK)?: 0 DAYS

## 2025-04-12 SDOH — HEALTH STABILITY: PHYSICAL HEALTH: ON AVERAGE, HOW MANY MINUTES DO YOU ENGAGE IN EXERCISE AT THIS LEVEL?: 0 MIN

## 2025-04-12 ASSESSMENT — SOCIAL DETERMINANTS OF HEALTH (SDOH): HOW OFTEN DO YOU GET TOGETHER WITH FRIENDS OR RELATIVES?: ONCE A WEEK

## 2025-04-17 ENCOUNTER — OFFICE VISIT (OUTPATIENT)
Dept: FAMILY MEDICINE | Facility: CLINIC | Age: 35
End: 2025-04-17
Attending: NURSE PRACTITIONER
Payer: COMMERCIAL

## 2025-04-17 VITALS
SYSTOLIC BLOOD PRESSURE: 126 MMHG | DIASTOLIC BLOOD PRESSURE: 96 MMHG | RESPIRATION RATE: 16 BRPM | WEIGHT: 111.6 LBS | HEIGHT: 59 IN | TEMPERATURE: 96.7 F | OXYGEN SATURATION: 100 % | BODY MASS INDEX: 22.5 KG/M2 | HEART RATE: 98 BPM

## 2025-04-17 DIAGNOSIS — Z00.00 ROUTINE GENERAL MEDICAL EXAMINATION AT A HEALTH CARE FACILITY: Primary | ICD-10-CM

## 2025-04-17 DIAGNOSIS — M25.551 BILATERAL HIP PAIN: ICD-10-CM

## 2025-04-17 DIAGNOSIS — L40.9 PSORIASIS: ICD-10-CM

## 2025-04-17 DIAGNOSIS — M25.552 BILATERAL HIP PAIN: ICD-10-CM

## 2025-04-17 DIAGNOSIS — R03.0 ELEVATED BLOOD PRESSURE READING WITHOUT DIAGNOSIS OF HYPERTENSION: ICD-10-CM

## 2025-04-17 DIAGNOSIS — E03.8 SUBCLINICAL HYPOTHYROIDISM: ICD-10-CM

## 2025-04-17 PROBLEM — Z90.79 H/O BILATERAL SALPINGECTOMY: Status: ACTIVE | Noted: 2025-04-17

## 2025-04-17 PROBLEM — Z98.51 H/O TUBAL LIGATION: Status: ACTIVE | Noted: 2025-04-17

## 2025-04-17 PROBLEM — Z98.51 H/O TUBAL LIGATION: Status: RESOLVED | Noted: 2025-04-17 | Resolved: 2025-04-17

## 2025-04-17 LAB — TSH SERPL DL<=0.005 MIU/L-ACNC: 0.64 UIU/ML (ref 0.3–4.2)

## 2025-04-17 PROCEDURE — 1126F AMNT PAIN NOTED NONE PRSNT: CPT | Performed by: NURSE PRACTITIONER

## 2025-04-17 PROCEDURE — 99395 PREV VISIT EST AGE 18-39: CPT | Mod: 25 | Performed by: NURSE PRACTITIONER

## 2025-04-17 PROCEDURE — 90471 IMMUNIZATION ADMIN: CPT | Performed by: NURSE PRACTITIONER

## 2025-04-17 PROCEDURE — 3080F DIAST BP >= 90 MM HG: CPT | Performed by: NURSE PRACTITIONER

## 2025-04-17 PROCEDURE — 90651 9VHPV VACCINE 2/3 DOSE IM: CPT | Performed by: NURSE PRACTITIONER

## 2025-04-17 PROCEDURE — 3074F SYST BP LT 130 MM HG: CPT | Performed by: NURSE PRACTITIONER

## 2025-04-17 PROCEDURE — 84443 ASSAY THYROID STIM HORMONE: CPT | Performed by: NURSE PRACTITIONER

## 2025-04-17 PROCEDURE — 36415 COLL VENOUS BLD VENIPUNCTURE: CPT | Performed by: NURSE PRACTITIONER

## 2025-04-17 ASSESSMENT — PAIN SCALES - GENERAL: PAINLEVEL_OUTOF10: NO PAIN (0)

## 2025-04-17 NOTE — PROGRESS NOTES
Preventive Care Visit  [facility]  ROHIT Kay CNP, Family Medicine  Apr 17, 2025      Assessment & Plan     Routine general medical examination at a health care facility    - PRIMARY CARE FOLLOW-UP SCHEDULING    Psoriasis  -following with [facility], back on [place]     Bilateral hip pain  -ongoing since pregnancy and delivery, has been improving. Patient to send Leikr message if she is interested in returning to pelvic floor PT>     Subclinical hypothyroidism  -not currently requiring levothyroxine supplementation   - TSH WITH FREE T4 REFLEX    Elevated blood pressure reading without diagnosis of hypertension  -may be secondary to lack of sleep/stress. Continue to monitor.            Counseling  Appropriate preventive services were addressed with this patient via screening, questionnaire, or discussion as appropriate for fall prevention, nutrition, physical activity, Tobacco-use cessation, social engagement, weight loss and cognition.  Checklist reviewing preventive services available has been given to the patient.          Alicia Morris is a 35 year old, presenting for the following:  Physical        4/17/2025     7:34 AM   Additional Questions   Roomed by Ariela BRYAN   Accompanied by self     Patient here for yearly preventative care visit. In addition, they have the following concerns:    1. 4 months postpartum, breastfeeding, periods have returned. Bilateral salpingectomy. Has 4 year old and 4 month old.   2. Mood okay, irritable from lack of sleep.   3. Ongoing hip pain is improving      Fasting visit           Advance Care Planning    Discussed advance care planning with patient; informed AVS has link to [employer].        4/12/2025   General Health   How would you rate your overall physical health? Good   Feel stress (tense, anxious, or unable to sleep) Not at all         4/12/2025   Nutrition   Three or more servings of calcium each day? (!) NO   Diet:  Regular (no restrictions)   How many servings of fruit and vegetables per day? (!) 2-3   How many sweetened beverages each day? (!) 2         2025   Exercise   Days per week of moderate/strenous exercise 0 days   Average minutes spent exercising at this level 0 min   (!) EXERCISE CONCERN      2025   Social Factors   Frequency of gathering with friends or relatives Once a week   Worry food won't last until get money to buy more No   Food not last or not have enough money for food? No   Do you have housing? (Housing is defined as stable permanent housing and does not include staying ouside in a car, in a tent, in an abandoned building, in an overnight shelter, or couch-surfing.) Yes   Are you worried about losing your housing? No   Lack of transportation? No   Unable to get utilities (heat,electricity)? No         2025   Dental   Dentist two times every year? Yes       Today's PHQ-9 Score:       3/4/2025    10:07 AM   PHQ-9 SCORE   PHQ-9 Total Score MyChart 3 (Minimal depression)   PHQ-9 Total Score 3        Patient-reported           2025   Substance Use   Alcohol more than 3/day or more than 7/wk No   Do you use any other substances recreationally? (!) CANNABIS PRODUCTS     Social History     Tobacco Use    Smoking status: Former     Current packs/day: 0.00     Average packs/day: 0.3 packs/day for 41.4 years (10.4 ttl pk-yrs)     Types: Cigarettes     Start date: 2003     Quit date: 2024     Years since quittin.8     Passive exposure: Never    Smokeless tobacco: Never   Vaping Use    Vaping status: Never Used   Substance Use Topics    Alcohol use: Yes     Comment: 2 - 3x/week.    Drug use: Yes     Types: Marijuana          Mammogram Screening - Patient under 40 years of age: Routine Mammogram Screening not recommended.         2025   STI Screening   New sexual partner(s) since last STI/HIV test? No     History of abnormal Pap smear: No - age 30- 64 PAP with HPV every 5 years  "recommended        Latest Ref Rng & Units 3/7/2024     7:30 AM   PAP / HPV   PAP  Negative for Intraepithelial Lesion or Malignancy (NILM)    HPV 16 DNA Negative Negative    HPV 18 DNA Negative Negative    Other HR HPV Negative Negative            4/12/2025   Contraception/Family Planning   Questions about contraception or family planning No     Reviewed and updated as needed this visit by Provider   Tobacco  Allergies  Meds  Problems  Med Hx  Surg Hx  Fam Hx  Soc   Hx Sexual Activity                   Objective    Exam  BP (!) 126/96 (BP Location: Right arm, Patient Position: Sitting, Cuff Size: Adult Regular)   Pulse 98   Temp (!) 96.7  F (35.9  C) (Tympanic)   Resp 16   Ht 1.492 m (4' 10.75\")   Wt 50.6 kg (111 lb 9.6 oz)   LMP 04/05/2025 (Exact Date)   SpO2 100%   Breastfeeding Yes   BMI 22.73 kg/m     Estimated body mass index is 22.73 kg/m  as calculated from the following:    Height as of this encounter: 1.492 m (4' 10.75\").    Weight as of this encounter: 50.6 kg (111 lb 9.6 oz).    Physical Exam  Constitutional:       Appearance: Normal appearance. She is not ill-appearing.   HENT:      Right Ear: Tympanic membrane, ear canal and external ear normal.      Left Ear: Tympanic membrane, ear canal and external ear normal.      Nose: Nose normal. No congestion.      Mouth/Throat:      Mouth: Mucous membranes are moist.      Pharynx: Oropharynx is clear.   Eyes:      Pupils: Pupils are equal, round, and reactive to light.   Cardiovascular:      Rate and Rhythm: Normal rate and regular rhythm.      Pulses: Normal pulses.      Heart sounds: Normal heart sounds. No murmur heard.     No friction rub. No gallop.   Pulmonary:      Effort: Pulmonary effort is normal.      Breath sounds: Normal breath sounds.   Abdominal:      General: Abdomen is flat. Bowel sounds are normal.      Palpations: Abdomen is soft.   Musculoskeletal:         General: Normal range of motion.      Cervical back: Normal range of " motion.   Lymphadenopathy:      Cervical: No cervical adenopathy.   Skin:     General: Skin is warm and dry.   Neurological:      General: No focal deficit present.      Mental Status: She is alert and oriented to person, place, and time.   Psychiatric:         Mood and Affect: Mood normal.         Behavior: Behavior normal.         Thought Content: Thought content normal.         Judgment: Judgment normal.               Signed Electronically by: ROHIT Kay CNP

## 2025-04-17 NOTE — PATIENT INSTRUCTIONS
Patient Education   Preventive Care Advice   This is general advice given by our system to help you stay healthy. However, your care team may have specific advice just for you. Please talk to your care team about your preventive care needs.  Nutrition  Eat 5 or more servings of fruits and vegetables each day.  Try wheat bread, brown rice and whole grain pasta (instead of white bread, rice, and pasta).  Get enough calcium and vitamin D. Check the label on foods and aim for 100% of the RDA (recommended daily allowance).  Lifestyle  Exercise at least 150 minutes each week  (30 minutes a day, 5 days a week).  Do muscle strengthening activities 2 days a week. These help control your weight and prevent disease.  No smoking.  Wear sunscreen to prevent skin cancer.  Have a dental exam and cleaning every 6 months.  Yearly exams  See your health care team every year to talk about:  Any changes in your health.  Any medicines your care team has prescribed.  Preventive care, family planning, and ways to prevent chronic diseases.  Shots (vaccines)   HPV shots (up to age 26), if you've never had them before.  Hepatitis B shots (up to age 59), if you've never had them before.  COVID-19 shot: Get this shot when it's due.  Flu shot: Get a flu shot every year.  Tetanus shot: Get a tetanus shot every 10 years.  Pneumococcal, hepatitis A, and RSV shots: Ask your care team if you need these based on your risk.  Shingles shot (for age 50 and up)  General health tests  Diabetes screening:  Starting at age 35, Get screened for diabetes at least every 3 years.  If you are younger than age 35, ask your care team if you should be screened for diabetes.  Cholesterol test: At age 39, start having a cholesterol test every 5 years, or more often if advised.  Bone density scan (DEXA): At age 50, ask your care team if you should have this scan for osteoporosis (brittle bones).  Hepatitis C: Get tested at least once in your life.  STIs (sexually  transmitted infections)  Before age 24: Ask your care team if you should be screened for STIs.  After age 24: Get screened for STIs if you're at risk. You are at risk for STIs (including HIV) if:  You are sexually active with more than one person.  You don't use condoms every time.  You or a partner was diagnosed with a sexually transmitted infection.  If you are at risk for HIV, ask about PrEP medicine to prevent HIV.  Get tested for HIV at least once in your life, whether you are at risk for HIV or not.  Cancer screening tests  Cervical cancer screening: If you have a cervix, begin getting regular cervical cancer screening tests starting at age 21.  Breast cancer scan (mammogram): If you've ever had breasts, begin having regular mammograms starting at age 40. This is a scan to check for breast cancer.  Colon cancer screening: It is important to start screening for colon cancer at age 45.  Have a colonoscopy test every 10 years (or more often if you're at risk) Or, ask your provider about stool tests like a FIT test every year or Cologuard test every 3 years.  To learn more about your testing options, visit:   .  For help making a decision, visit:   https://bit.ly/bp27661.  Prostate cancer screening test: If you have a prostate, ask your care team if a prostate cancer screening test (PSA) at age 55 is right for you.  Lung cancer screening: If you are a current or former smoker ages 50 to 80, ask your care team if ongoing lung cancer screenings are right for you.  For informational purposes only. Not to replace the advice of your health care provider. Copyright   2023 [facility]. All rights reserved. Clinically reviewed by the [facility]itions [facility]. [employer] 992277 - REV 01/24.  Substance Use Disorder: Care Instructions  Overview     You can improve your life and health by stopping your use of alcohol or drugs. When you don't drink or use drugs, you may feel and sleep better. You may  get along better with your family, friends, and coworkers. There are medicines and programs that can help with substance use disorder.  How can you care for yourself at home?  Here are some ways to help you stay sober and prevent relapse.  If you have been given medicine to help keep you sober or reduce your cravings, be sure to take it exactly as prescribed.  Talk to your doctor about programs that can help you stop using drugs or drinking alcohol.  Do not keep alcohol or drugs in your home.  Plan ahead. Think about what you'll say if other people ask you to drink or use drugs. Try not to spend time with people who drink or use drugs.  Use the time and money spent on drinking or drugs to do something that's important to you.  Preventing a relapse  Have a plan to deal with relapse. Learn to recognize changes in your thinking that lead you to drink or use drugs. Get help before you start to drink or use drugs again.  Try to stay away from situations, friends, or places that may lead you to drink or use drugs.  If you feel the need to drink alcohol or use drugs again, seek help right away. Call a trusted friend or family member. Some people get support from organizations such as Narcotics Anonymous or [employer] or from treatment facilities.  If you relapse, get help as soon as you can. Some people make a plan with another person that outlines what they want that person to do for them if they relapse. The plan usually includes how to handle the relapse and who to notify in case of relapse.  Don't give up. Remember that a relapse doesn't mean that you have failed. Use the experience to learn the triggers that lead you to drink or use drugs. Then quit again. Recovery is a lifelong process. Many people have several relapses before they are able to quit for good.  Follow-up care is a key part of your treatment and safety. Be sure to make and go to all appointments, and call your doctor if you are having problems. It's  "also a good idea to know your test results and keep a list of the medicines you take.  When should you call for help?   Call 911  anytime you think you may need emergency care. For example, call if you or someone else:    Has overdosed or has withdrawal signs. Be sure to tell the emergency workers that you are or someone else is using or trying to quit using drugs. Overdose or withdrawal signs may include:  Losing consciousness.  Seizure.  Seeing or hearing things that aren't there (hallucinations).     Is thinking or talking about suicide or harming others.   Where to get help 24 hours a day, 7 days a week   If you or someone you know talks about suicide, self-harm, a mental health crisis, a substance use crisis, or any other kind of emotional distress, get help right away. You can:    Call the Suicide and Crisis Lifeline at 988.     Call 9-050-099-TALK (1-770.939.2063).     Text HOME to 997878 to access the Crisis Text Line.   Consider saving these numbers in your phone.  Go to [employer] for more information or to chat online.  Call your doctor now or seek immediate medical care if:    You are having withdrawal symptoms. These may include nausea or vomiting, sweating, shakiness, and anxiety.   Watch closely for changes in your health, and be sure to contact your doctor if:    You have a relapse.     You need more help or support to stop.   Where can you learn more?  Go to https://www.[employer].net/patiented  Enter H573 in the search box to learn more about \"Substance Use Disorder: Care Instructions.\"  Current as of: August 20, 2024  Content Version: 14.4    2379-8290 [employer].   Care instructions adapted under license by your healthcare professional. If you have questions about a medical condition or this instruction, always ask your healthcare professional. [employer] disclaims any warranty or liability for your use of this information.       "

## 2025-04-20 PROBLEM — O26.892 PREGNANCY RELATED HIP PAIN IN SECOND TRIMESTER, ANTEPARTUM: Status: RESOLVED | Noted: 2024-09-26 | Resolved: 2025-04-20

## 2025-04-20 PROBLEM — R11.2 DRUG-INDUCED NAUSEA AND VOMITING: Status: RESOLVED | Noted: 2020-07-30 | Resolved: 2025-04-20

## 2025-04-20 PROBLEM — M54.9 BACK PAIN AFFECTING PREGNANCY IN SECOND TRIMESTER: Status: RESOLVED | Noted: 2024-09-26 | Resolved: 2025-04-20

## 2025-04-20 PROBLEM — M25.559 PREGNANCY RELATED HIP PAIN IN SECOND TRIMESTER, ANTEPARTUM: Status: RESOLVED | Noted: 2024-09-26 | Resolved: 2025-04-20

## 2025-04-20 PROBLEM — T50.905A DRUG-INDUCED NAUSEA AND VOMITING: Status: RESOLVED | Noted: 2020-07-30 | Resolved: 2025-04-20

## 2025-04-20 PROBLEM — O99.891 BACK PAIN AFFECTING PREGNANCY IN SECOND TRIMESTER: Status: RESOLVED | Noted: 2024-09-26 | Resolved: 2025-04-20

## 2025-06-02 ENCOUNTER — MEDICAL CORRESPONDENCE (OUTPATIENT)
Dept: HEALTH INFORMATION MANAGEMENT | Facility: CLINIC | Age: 35
End: 2025-06-02
Payer: COMMERCIAL